# Patient Record
Sex: FEMALE | Race: WHITE | Employment: UNEMPLOYED | URBAN - METROPOLITAN AREA
[De-identification: names, ages, dates, MRNs, and addresses within clinical notes are randomized per-mention and may not be internally consistent; named-entity substitution may affect disease eponyms.]

---

## 2021-09-02 NOTE — PROGRESS NOTES
[address]  [address], [address]  [address] [place] [address]  Dept: [phone]  Dept Fax: [phone]  Loc: [phone]    Visit Date: 9/3/2021    Artur Stoll is a 64 y.o. female who presents today for: Established New Doctor and Menopause (discuss hot flashes )      Here to establish care as new patient. Assessment/Plan:     ASSESSMENT/PLAN:     Diagnosis Orders   1. Encounter to establish care with new doctor     2. Annual physical exam  CBC    Comprehensive Metabolic Panel    Lipid Panel    Vitamin D 25 Hydroxy   3. Encounter for screening for malignant neoplasm of breast, unspecified screening modality  [address] was seen today for established new doctor and menopause. Diagnoses and all orders for this visit:    Encounter to establish care with new doctor    Annual physical exam  -     CBC; Future  -     Comprehensive Metabolic Panel; Future  -     Lipid Panel; Future  -     Vitamin D 25 Hydroxy; Future    Encounter for screening for malignant neoplasm of breast, unspecified screening modality  -     [facility] DIGITAL SCREENING AUGMENTED BILATERAL; Future        HPI:     HPI  Patient's problem list, medications, past medical, surgical, family, and social histories were reviewed and updated in the chart as indicated today. There is no problem list on file for this patient. Current Outpatient Medications:     levothyroxine (SYNTHROID) 25 MCG tablet, TAKE 1 TABLET BY MOUTH ONCE DAILY, Disp: , Rfl:     Allergies   Allergen Reactions    Sulfa Antibiotics Other (See Comments)       Past Surgical History:   Procedure Laterality Date    APPENDECTOMY      BREAST SURGERY      Mammoplasty       Family History: History reviewed. No pertinent family history.     Social History:   Social History     Tobacco Use    Smoking status: Former Smoker     Packs/day: 0.50     Years: 10.00     Pack years: 5.00     Types: Cigarettes     Quit date: 2002     Years since quittin.6    Smokeless tobacco: Never Used   Substance Use Topics    Alcohol use: Not Currently        Subjective:      Review of Systems   Constitutional: Negative for unexpected weight change. HENT: Negative. Eyes: Negative for redness. Respiratory: Negative for shortness of breath. Cardiovascular: Negative for chest pain and leg swelling. Gastrointestinal: Negative for constipation, diarrhea, nausea and vomiting. Skin: Negative for pallor. Allergic/Immunologic: Negative for immunocompromised state. Psychiatric/Behavioral: Negative for confusion. Objective:     /82 (Site: Left Upper Arm, Position: Sitting, Cuff Size: Medium Adult)   Pulse 70   Ht 5' 7\" (1.702 m)   Wt 140 lb (63.5 kg)   SpO2 99%   BMI 21.93 kg/m²     Well developed well nourished patient in no acute distress. Alert and oriented to person, place, and time. HEENT:Normocephalic, atraumatic. No scleral icterus. Pupils normal  Heart: Regular Rate and Rhythm. No murmurs. Respirations: lungs clear to auscultation bilaterally. Abdomin: Bowel sounds present. Extremities: No peripheral edema. No erythema. Pap recommendations:  Age 33-67: cytology with HPV every 5 years.    .    Electronically signed by Rhoda Campbell DO on 9/3/2021 at 8:42 AM

## 2021-09-03 ENCOUNTER — OFFICE VISIT (OUTPATIENT)
Dept: FAMILY MEDICINE CLINIC | Age: 56
End: 2021-09-03
Payer: COMMERCIAL

## 2021-09-03 VITALS
HEIGHT: 67 IN | HEART RATE: 70 BPM | OXYGEN SATURATION: 99 % | SYSTOLIC BLOOD PRESSURE: 118 MMHG | DIASTOLIC BLOOD PRESSURE: 82 MMHG | BODY MASS INDEX: 21.97 KG/M2 | WEIGHT: 140 LBS

## 2021-09-03 DIAGNOSIS — Z00.00 ANNUAL PHYSICAL EXAM: ICD-10-CM

## 2021-09-03 DIAGNOSIS — Z76.89 ENCOUNTER TO ESTABLISH CARE WITH NEW DOCTOR: Primary | ICD-10-CM

## 2021-09-03 DIAGNOSIS — E03.9 ACQUIRED HYPOTHYROIDISM: ICD-10-CM

## 2021-09-03 DIAGNOSIS — Z12.39 ENCOUNTER FOR SCREENING FOR MALIGNANT NEOPLASM OF BREAST, UNSPECIFIED SCREENING MODALITY: ICD-10-CM

## 2021-09-03 LAB
A/G RATIO: 2 (ref 1.1–2.2)
ALBUMIN SERPL-MCNC: 4.8 G/DL (ref 3.4–5)
ALP BLD-CCNC: 76 U/L (ref 40–129)
ALT SERPL-CCNC: 11 U/L (ref 10–40)
ANION GAP SERPL CALCULATED.3IONS-SCNC: 12 MMOL/L (ref 3–16)
AST SERPL-CCNC: 21 U/L (ref 15–37)
BILIRUB SERPL-MCNC: 0.3 MG/DL (ref 0–1)
BUN BLDV-MCNC: 14 MG/DL (ref 7–20)
CALCIUM SERPL-MCNC: 9.8 MG/DL (ref 8.3–10.6)
CHLORIDE BLD-SCNC: 102 MMOL/L (ref 99–110)
CHOLESTEROL, TOTAL: 219 MG/DL (ref 0–199)
CO2: 26 MMOL/L (ref 21–32)
CREAT SERPL-MCNC: 0.8 MG/DL (ref 0.6–1.1)
GFR AFRICAN AMERICAN: >60
GFR NON-AFRICAN AMERICAN: >60
GLOBULIN: 2.4 G/DL
GLUCOSE BLD-MCNC: 94 MG/DL (ref 70–99)
HCT VFR BLD CALC: 37.5 % (ref 36–48)
HDLC SERPL-MCNC: 67 MG/DL (ref 40–60)
HEMOGLOBIN: 13 G/DL (ref 12–16)
LDL CHOLESTEROL CALCULATED: 136 MG/DL
MCH RBC QN AUTO: 31.4 PG (ref 26–34)
MCHC RBC AUTO-ENTMCNC: 34.6 G/DL (ref 31–36)
MCV RBC AUTO: 90.6 FL (ref 80–100)
PDW BLD-RTO: 13.3 % (ref 12.4–15.4)
PLATELET # BLD: 244 K/UL (ref 135–450)
PMV BLD AUTO: 7.7 FL (ref 5–10.5)
POTASSIUM SERPL-SCNC: 4.8 MMOL/L (ref 3.5–5.1)
RBC # BLD: 4.14 M/UL (ref 4–5.2)
SODIUM BLD-SCNC: 140 MMOL/L (ref 136–145)
T4 FREE: 1 NG/DL (ref 0.9–1.8)
TOTAL PROTEIN: 7.2 G/DL (ref 6.4–8.2)
TRIGL SERPL-MCNC: 79 MG/DL (ref 0–150)
TSH REFLEX FT4: 8.4 UIU/ML (ref 0.27–4.2)
VITAMIN D 25-HYDROXY: 34.1 NG/ML
VLDLC SERPL CALC-MCNC: 16 MG/DL
WBC # BLD: 4.3 K/UL (ref 4–11)

## 2021-09-03 PROCEDURE — 36415 COLL VENOUS BLD VENIPUNCTURE: CPT | Performed by: FAMILY MEDICINE

## 2021-09-03 PROCEDURE — 99386 PREV VISIT NEW AGE 40-64: CPT | Performed by: FAMILY MEDICINE

## 2021-09-03 RX ORDER — LEVOTHYROXINE SODIUM 0.03 MG/1
TABLET ORAL
COMMUNITY
Start: 2021-08-08 | End: 2022-08-05 | Stop reason: SDUPTHER

## 2021-09-03 SDOH — ECONOMIC STABILITY: FOOD INSECURITY: WITHIN THE PAST 12 MONTHS, YOU WORRIED THAT YOUR FOOD WOULD RUN OUT BEFORE YOU GOT MONEY TO BUY MORE.: NEVER TRUE

## 2021-09-03 SDOH — ECONOMIC STABILITY: FOOD INSECURITY: WITHIN THE PAST 12 MONTHS, THE FOOD YOU BOUGHT JUST DIDN'T LAST AND YOU DIDN'T HAVE MONEY TO GET MORE.: NEVER TRUE

## 2021-09-03 ASSESSMENT — PATIENT HEALTH QUESTIONNAIRE - PHQ9
SUM OF ALL RESPONSES TO PHQ9 QUESTIONS 1 & 2: 0
2. FEELING DOWN, DEPRESSED OR HOPELESS: 0
1. LITTLE INTEREST OR PLEASURE IN DOING THINGS: 0
SUM OF ALL RESPONSES TO PHQ QUESTIONS 1-9: 0

## 2021-09-03 ASSESSMENT — SOCIAL DETERMINANTS OF HEALTH (SDOH): HOW HARD IS IT FOR YOU TO PAY FOR THE VERY BASICS LIKE FOOD, HOUSING, MEDICAL CARE, AND HEATING?: NOT HARD AT ALL

## 2021-09-03 NOTE — PATIENT INSTRUCTIONS
Your fasting blood sugar should be below 100. If it is between 100-125 that is considered high and known as prediabetes, or  impaired glucose tolerance. If your blood sugar is too high, go to diabetes. org for a diabetes prevention diet. The A1c shows your average blood sugar over the previous 3 months. It is sometimes ordered if your blood sugar is elevated  You do not have to be fasting to get it drawn. If it is 5.6 or below it means your blood sugar is in the normal range  If between 5.7 and 6.5 it is impaired glucose tolerance. If it is above 6.5 it is consistent with the diagnosis of  diabetes. Decrease high carb foods if your blood sugar is high. High carbohydrate foods are:  Breads, muffins, rolls, and bagels  Pasta, rice, corn, and grains  Potatoes, sweet potatoes  Legumes: peas beans lentils. Milk ( almond milk ok)  Most fruit except berries  Cake cookies pies ice cream candy etc.  Juices, soda, sweetened ice tea  Beer. Cholesterol, the ideal numbers explained: Total cholesterol should be below 200  The triglycerides should be below 150  The HDL, the good cholesterol should be above 40  The LDL, the bad cholesterol should be below 100. Although it can be as high as 130 if no risk factors for heart disease or no diabetes. Improve your cholesterol profile:     Cardiovascular exercise helps. Start with 15 minutes three times a week and the work up to at least 30 minutes 5 days a week. Exercise at a level that you can carry on a conversation during. Loose extra pounds. Pay attention to your serving sides and avoid eating second helpings at meals. Significantly decrease the amount of processed food, junk food, and fast food that you eat. Decrease animal sources of saturated fats, and completely avoid and eliminate  hydrogenated oils and trans fats. Check the Food Label on the food you eat and avoid foods that have hydrogenated vegetable oils in them.  That includes bakery products. Drink skim milk which contains no fat. Increase the amount of fresh food that you cook yourself. Eat at least five servings of fruits and vegetables a day. Increase the portion of your plate that contains the fruit and vegetables to at least 50%,-70%,  and decrease the amount of starches like potatoes, rice, pasta to no more than 25% of your plate. Increase your fiber intake. Fiber is found in fruits and vegetables as well as whole grains, oatmeal,  and beans. A diet with at least 5 servings of fruits and vegetables should give you about 10-20grams of fiber daily. Switch to monounsaturated fats like olive oil. Fat from olives, avocados, coconut, or other nuts is okay. Add baked or grilled fish to you diet at least 1-2 times a week. Learn more about cholesterol on the Internet. Check out these resources:    American Heart Association   Heart. [address]:   [employer]    Triglycerides can be lowered without medication by exercising, and loosing weight and eating a diet lower in carbohydrates especially from flour sources,  and avoiding simple sugars. The good cholesterol is HDL. In order to increase the good cholesterol, increasing cardiovascular exercise helps. Avoid hydrogenated oils (trans fats) in processed, bakery,  and junk food. Use monounsaturated fats such as olive oil can help raise the good cholesterol. For your weight, exercise 30 minutes 5 times weekly and improve the types of food you eat:    Protein   Best options: The American Diabetes Association (ADA) recommends lean proteins low in saturated fat, like fish or turkey. Aim for two servings of seafood each week; some fish, like salmon, have the added benefit of containing heart healthy omega-3 fats. For a vegetarian protein source, experiment with the wide variety of beans. Nuts, which are protein and healthy fats powerhouses, are also a choice.    Worst options: Processed deli meats and hot dogs have high amounts of fat along with lots of sodium, which can increase the risk of high blood pressure. Heart attack and stroke are two common complications of diabetes, so keeping blood pressure in check is important. Grains   Best options: When choosing grains, make sure theyre whole. Decrease the amount of foods which contain flour. Whole grains such as wild rice, quinoa, and whole grain breads and cereals contain fiber, which is beneficial for digestive health, but often the grains flour products raise your blood sugar and when your blood sugar returns to normal, it can trigger the desire to eat again.   Worst options: Refined white flour doesnt contain the same health benefits as whole grains. Processed foods made with white flour include breakfast cereals, white bread, and pastries, cookies, muffins, pretzels, crackers, so avoid these options. Also try to steer clear of white rice and pasta. Dairy   Best options: With only 6 to 8 grams of carbohydrates in a serving, plain nonfat [place] yogurt is a healthy and versatile dairy option. You can add berries and enjoy it for dessert or breakfast; you can use it in recipes as a replacement for sour cream, which is high in saturated fat. Skim milk.  Worst options: Avoid all full-fat dairy products and especially packaged chocolate milk, as it also has added sugar. Avoid yogurts with added sugar. Vegetables   Best options: Non-starchy vegetables such as leafy greens, broccoli, cauliflower, asparagus, and carrots are low in carbohydrates and high in fiber and other nutrients, Raysa Regina says. You can eat non-starchy vegetables in abundance -- half of your plate should be filled with these veggies. If youre craving mashed potatoes, give mashed cauliflower a try.  Worst options: Stick to small portions of starchy vegetables such as corn, potatoes, and peas. These items are nutritious, but should be eaten in moderation.  The ADA groups them with grains because of their high carb content. Fruit   Best options: Fresh fruit can conquer your craving for sweets while providing antioxidants and fiber. Berries are a great option because recommended portion sizes are typically generous, which may leave you feeling more satisfied   Worst options: Avoid added sugar by eliminating fruits canned in syrup, and be aware that dried fruits have a very high sugar concentration. Also, fruit juices should be consumed rarely as theyre high in sugar and dont contain the same nutrients as whole fruit. Fats   Best options: Some types of fat actually help protect your heart. Choose the monounsaturated fats found in avocados, almonds, olives, and pecans or the polyunsaturated fats found in walnuts and sunflower oil, which can help to lower bad cholesterol.  Worst options: Saturated fats increase bad cholesterol, so limit butter, cheese, gravy, and fried foods. Keep calories from animal sources of saturated fat to less than 10 percent of your total daily intake. Trans fats are even worse than saturated fats, so avoid them completely. Look for the term hydrogenated on labels of processed foods such as packaged snacks, baked goods, and crackers.  For more help losing weight:         Keep track of what you eat everyday :      Lose it! ben for the smart phone, or loseit. com or Notifixious.Fetch MD are free online tools you can use to track your daily intake.     The online tools will help you establish your goals for weight loss and how much you should eat a day to accomplish the goal.       A BMI (body mass index) of 25 to 29 is considered overweight, a BMI of 30 and above is considered obese. BMI is found with your vitals on your After Visit Summary. In addition to the above diet info,    Iincrease the amount of fruit and vegetables you eat each day. Ideally you are eating at least 5 servings of fruit and or vegetables a day.        Drink at least 50 ounces of water a day.      Avoid or decrease processed food, fast food, and junk food.     Begin to exercise 15 minutes three times a week doing cardiovascular exercise.     Don't quit. It can take 12 weeks to break old habits before you feel like you are in a new routine. Then you have to live your new lifestyle. Irina Ramos Once you are adjusted to your new habits you will not have to keep recording your daily intake, you should have a good idea of what is a normal amount for you to eat each day.     If you are obese, have elevated blood sugar, heart disease or a medical condition that is worsened by excess weight, I can refer you to a [employer] for education, usually covered by insurance.      Over the counter supplements may help. Estroven or icool gave good satisfaction reports from women with hot flashes. Proper diet helps: At least 5 servings of fruits and vegetables, and low to no amounts of simple sugars. Adding soy based foods to your diet helps, such as tofu, and soy milk. You can  ground flax seeds to your salads, or milk can help. Adding melatonin supplement may help you sleep better. Vitamin D3 at 1000units a day will be beneficial in this perimenopausal time as well especially if you are low in Vitamin D. Lowering room temperatures. Consuming cool drinks. Avoiding alcohol and caffeine. For overweight and obese women, weight loss can also help. Consider prescription medication of Effexor or Paxil which can be helpful in hot flashes.

## 2021-09-07 DIAGNOSIS — E03.9 ACQUIRED HYPOTHYROIDISM: Primary | ICD-10-CM

## 2022-01-10 ENCOUNTER — TELEPHONE (OUTPATIENT)
Dept: FAMILY MEDICINE CLINIC | Age: 57
End: 2022-01-10

## 2022-01-10 NOTE — TELEPHONE ENCOUNTER
Patient has had bad cough for three days producing thick yellow mucus. . Patient has been taking Nyquil and Dayquil that has helped some. Patient had has chills and sweats and whole body pains. Galileo Briscoe is asking for medication. She says her cough is bad and painful. No future appointments. Past appointment was 9/3/21. Patient's pharmacy is [facility].

## 2022-03-02 NOTE — PROGRESS NOTES
03/04/22      [facility]  Chief Complaint   Patient presents with    Discuss Labs     wanting lung scan / respiratory issues last couple months           Diagnosis Orders   1.  bronchitis, repeated  CT Lung Screen (Initial or Annual)   2. Former smoker     3. Dermatitis  triamcinolone (KENALOG) 0.025 % cream   4. Gastroesophageal reflux disease, unspecified whether esophagitis present       Adenike was seen today for discuss labs. Diagnoses and all orders for this visit:    History of repeated bronchitis since last summer  She is interested in getting a CT lung screening at [address] [place] [address] (Initial or Annual); Future    Dermatitis, intermittent. -     triamcinolone (KENALOG) 0.025 % cream; Apply topically 2 times daily as needed for rash,  up to 2 weeks, . Gastroesophageal reflux disease, more likely than gastritis due to physical exam today, exacerbated by drinking coffee. Plan is to decrease the offending coffee, take Pepcid as needed, if it is persistent daily I would recommend a EDG        HPI  Was sick with bronchitis in the fall, then in January flared up again. She had upper respiratory infection in January. She is interested in the lung scan. She quit smoking in 2002 after smoking half pack a day for 10 years. Has a 5-pack-year history. Her last labs were done in September when she was a new patient. Those labs are reviewed. Her TSH was high with a normal T4 and it was recommended to get that rechecked in 6 weeks. That was in September. She is on thyroid medication. Her vitamin D was normal, her blood count was normal, her cholesterol was high with a good H DL. The LDL was 136.   These electrolytes liver and kidney function were all normal.  Protein and calcium also normal.  Past Medical History:   Diagnosis Date    Hypothyroidism      Social History     Socioeconomic History    Marital status:      Spouse name: Not on file    Number of children: Not on file    Years of education: Not on file    Highest education level: Not on file   Occupational History    Not on file   Tobacco Use    Smoking status: Former Smoker     Packs/day: 0.50     Years: 10.00     Pack years: 5.00     Types: Cigarettes     Quit date: 2002     Years since quittin.1    Smokeless tobacco: Never Used   Vaping Use    Vaping Use: Never used   Substance and Sexual Activity    Alcohol use: Not Currently    Drug use: Never    Sexual activity: Not Currently   Other Topics Concern    Not on file   Social History Narrative    Not on file     Social Determinants of Health     Financial Resource Strain: Low Risk     Difficulty of Paying Living Expenses: Not hard at all   Food Insecurity: No Food Insecurity    Worried About [address] in the Last Year: Never true    [address]  [employer] in the Last Year: Never true   Transportation Needs:     Lack of Transportation (Medical): Not on file    Lack of Transportation (Non-Medical):  Not on file   Physical Activity:     Days of Exercise per Week: Not on file    Minutes of Exercise per Session: Not on file   Stress:     Feeling of Stress : Not on file   Social Connections:     Frequency of Communication with Friends and Family: Not on file    Frequency of Social Gatherings with Friends and Family: Not on file    Attends Gnosticism Services: Not on file    Active Member of [address] [place] [address] or Organizations: Not on file    Attends Club or Organization Meetings: Not on file    Marital Status: Not on file   Intimate Partner Violence:     Fear of Current or Ex-Partner: Not on file    Emotionally Abused: Not on file    Physically Abused: Not on file    Sexually Abused: Not on file   Housing Stability:     Unable to Pay for Housing in the Last Year: Not on file    Number of [place] in the Last Year: Not on file    Unstable Housing in the Last Year: Not on file           Current Outpatient Medications   Medication Sig Dispense Refill    triamcinolone (KENALOG) 0.025 % cream Apply topically 2 times daily as needed for rash,  up to 2 weeks, . 60 g 0    levothyroxine (SYNTHROID) 25 MCG tablet TAKE 1 TABLET BY MOUTH ONCE DAILY       No current facility-administered medications for this visit. Vitals:    03/04/22 0838   BP: 128/84   Pulse: 70   SpO2: 95%     BP Readings from Last 5 Encounters:   03/04/22 128/84   09/03/21 118/82       ROS:  No fever or chills  No unexpected wt loss  No headaches  No chest pain  No shortness of breath  + nausea, no vomiting or diarrhea      Physical Exam  Well developed well nourished patient   in no acute distress. Alert and oriented to person, place, and time. HEENT:Normocephalic, atraumatic. No scleral icterus. Pupils normal  Heart: Regular Rate and Rhythm. Respirations: lungs clear to auscultation bilaterally. Abdomin: Bowel sounds present. No hypogastric tenderness no masses  Extremities: No peripheral edema. No erythema. Skin: Positive hypopigmented macules, erythematous papules on the upper back left side      Time spent today included for this patient visit includes time spent preparing to see the patient  Including review of tests, labs and imaging,   revewing previous history and recent encounters,   obtaining and/or reviewing separately obtained history in care everywhere,  performing a medically appropriate examination and/or evaluation;   counseling and educating the patient and /family/caregiver when present,  ordering medications, tests, or procedures;   referring to other health care specialists;   documenting clinical information in the electronic health record;   independently interpreting results (not separately reported)   and communicating results to the patient. An electronic signature was used to authenticate this note. This was dictated. Errors mightbe possible due to dictation.   --Lizeth Montano,

## 2022-03-04 ENCOUNTER — OFFICE VISIT (OUTPATIENT)
Dept: FAMILY MEDICINE CLINIC | Age: 57
End: 2022-03-04
Payer: COMMERCIAL

## 2022-03-04 VITALS
HEART RATE: 70 BPM | HEIGHT: 67 IN | OXYGEN SATURATION: 95 % | BODY MASS INDEX: 21.93 KG/M2 | SYSTOLIC BLOOD PRESSURE: 128 MMHG | DIASTOLIC BLOOD PRESSURE: 84 MMHG

## 2022-03-04 DIAGNOSIS — Z87.09 HISTORY OF BRONCHITIS: Primary | ICD-10-CM

## 2022-03-04 DIAGNOSIS — Z87.891 FORMER SMOKER: ICD-10-CM

## 2022-03-04 DIAGNOSIS — K21.9 GASTROESOPHAGEAL REFLUX DISEASE, UNSPECIFIED WHETHER ESOPHAGITIS PRESENT: ICD-10-CM

## 2022-03-04 DIAGNOSIS — L30.9 DERMATITIS: ICD-10-CM

## 2022-03-04 PROCEDURE — 3017F COLORECTAL CA SCREEN DOC REV: CPT | Performed by: FAMILY MEDICINE

## 2022-03-04 PROCEDURE — G8427 DOCREV CUR MEDS BY ELIG CLIN: HCPCS | Performed by: FAMILY MEDICINE

## 2022-03-04 PROCEDURE — G8484 FLU IMMUNIZE NO ADMIN: HCPCS | Performed by: FAMILY MEDICINE

## 2022-03-04 PROCEDURE — 99214 OFFICE O/P EST MOD 30 MIN: CPT | Performed by: FAMILY MEDICINE

## 2022-03-04 PROCEDURE — 1036F TOBACCO NON-USER: CPT | Performed by: FAMILY MEDICINE

## 2022-03-04 PROCEDURE — G8420 CALC BMI NORM PARAMETERS: HCPCS | Performed by: FAMILY MEDICINE

## 2022-03-04 RX ORDER — TRIAMCINOLONE ACETONIDE 0.25 MG/G
CREAM TOPICAL
Qty: 60 G | Refills: 0 | Status: SHIPPED | OUTPATIENT
Start: 2022-03-04

## 2022-05-27 ENCOUNTER — OFFICE VISIT (OUTPATIENT)
Dept: FAMILY MEDICINE CLINIC | Age: 57
End: 2022-05-27
Payer: COMMERCIAL

## 2022-05-27 VITALS
OXYGEN SATURATION: 98 % | HEART RATE: 65 BPM | SYSTOLIC BLOOD PRESSURE: 130 MMHG | DIASTOLIC BLOOD PRESSURE: 84 MMHG | TEMPERATURE: 97.1 F | RESPIRATION RATE: 16 BRPM

## 2022-05-27 DIAGNOSIS — R20.2 RIGHT LEG PARESTHESIAS: Primary | ICD-10-CM

## 2022-05-27 DIAGNOSIS — G57.01 PIRIFORMIS SYNDROME OF RIGHT SIDE: ICD-10-CM

## 2022-05-27 PROCEDURE — G8427 DOCREV CUR MEDS BY ELIG CLIN: HCPCS | Performed by: NURSE PRACTITIONER

## 2022-05-27 PROCEDURE — 3017F COLORECTAL CA SCREEN DOC REV: CPT | Performed by: NURSE PRACTITIONER

## 2022-05-27 PROCEDURE — 99214 OFFICE O/P EST MOD 30 MIN: CPT | Performed by: NURSE PRACTITIONER

## 2022-05-27 PROCEDURE — 1036F TOBACCO NON-USER: CPT | Performed by: NURSE PRACTITIONER

## 2022-05-27 PROCEDURE — G8420 CALC BMI NORM PARAMETERS: HCPCS | Performed by: NURSE PRACTITIONER

## 2022-05-27 RX ORDER — OMEGA-3/DHA/EPA/FISH OIL 300-1000MG
2 CAPSULE ORAL DAILY
COMMUNITY

## 2022-05-27 RX ORDER — ERGOCALCIFEROL 1.25 MG/1
CAPSULE ORAL
COMMUNITY

## 2022-05-27 ASSESSMENT — PATIENT HEALTH QUESTIONNAIRE - PHQ9
DEPRESSION UNABLE TO ASSESS: FUNCTIONAL CAPACITY MOTIVATION LIMITS ACCURACY
SUM OF ALL RESPONSES TO PHQ QUESTIONS 1-9: 0
1. LITTLE INTEREST OR PLEASURE IN DOING THINGS: 0
SUM OF ALL RESPONSES TO PHQ9 QUESTIONS 1 & 2: 0
SUM OF ALL RESPONSES TO PHQ QUESTIONS 1-9: 0
2. FEELING DOWN, DEPRESSED OR HOPELESS: 0
SUM OF ALL RESPONSES TO PHQ QUESTIONS 1-9: 0
SUM OF ALL RESPONSES TO PHQ QUESTIONS 1-9: 0

## 2022-05-27 ASSESSMENT — ANXIETY QUESTIONNAIRES
6. BECOMING EASILY ANNOYED OR IRRITABLE: 0
IF YOU CHECKED OFF ANY PROBLEMS ON THIS QUESTIONNAIRE, HOW DIFFICULT HAVE THESE PROBLEMS MADE IT FOR YOU TO DO YOUR WORK, TAKE CARE OF THINGS AT HOME, OR GET ALONG WITH OTHER PEOPLE: NOT DIFFICULT AT ALL
1. FEELING NERVOUS, ANXIOUS, OR ON EDGE: 0
3. WORRYING TOO MUCH ABOUT DIFFERENT THINGS: 0
4. TROUBLE RELAXING: 0
GAD7 TOTAL SCORE: 0
7. FEELING AFRAID AS IF SOMETHING AWFUL MIGHT HAPPEN: 0
5. BEING SO RESTLESS THAT IT IS HARD TO SIT STILL: 0
2. NOT BEING ABLE TO STOP OR CONTROL WORRYING: 0

## 2022-05-27 ASSESSMENT — ENCOUNTER SYMPTOMS
SHORTNESS OF BREATH: 0
DIARRHEA: 0
VOMITING: 0
NAUSEA: 0
BACK PAIN: 1
COUGH: 0

## 2022-05-27 NOTE — PROGRESS NOTES
2022     Chief Complaint   Patient presents with    Other     leg numbness for a year and tingling in right foot      Kaylin Mattson (:  1965) is a 62 y.o. female, here for evaluation of the following medical concerns:    HPI    Here with concerns of numbness to the right foot heel area for the past year. Over the past week has noticed some tingling in the foot and leg area. Has some occasional low back ache but no increase in this pain recently. Has tight IT bands. She initially thought her symptoms were related to sitting on a barstool for long period of time. She denies any leg weakness, falls, bowel or bladder incontinence or saddle anesthesia. She has done a few stretches and takes some type of natural anti-inflammatory supplement which have not provided much relief. Feels well otherwise without any other concerns today. Review of Systems   Constitutional: Negative for chills, fatigue and fever. Respiratory: Negative for cough and shortness of breath. Cardiovascular: Negative for chest pain and leg swelling. Gastrointestinal: Negative for diarrhea, nausea and vomiting. Musculoskeletal: Positive for back pain (slight ache at times). Neurological: Negative for dizziness and headaches. All other systems reviewed and are negative. Prior to Visit Medications    Medication Sig Taking? Authorizing Provider   Multiple Vitamin (MULTIVITAMIN ADULT PO) Take 1 tablet by mouth daily Yes Historical Provider, MD   fish oil-omega-3 fatty acids 1000 MG capsule Take 2 g by mouth daily Yes Historical Provider, MD   vitamin D (ERGOCALCIFEROL) 1.25 MG (16111 UT) CAPS capsule Take by mouth Yes Historical Provider, MD   CALCIUM ASCORBATE PO Take by mouth Yes Historical Provider, MD   triamcinolone (KENALOG) 0.025 % cream Apply topically 2 times daily as needed for rash,  up to 2 weeks, .  Yes Felecia Tarango DO   levothyroxine (SYNTHROID) 25 MCG tablet TAKE 1 TABLET BY MOUTH ONCE DAILY Yes Historical Provider, MD        Social History     Tobacco Use    Smoking status: Former Smoker     Packs/day: 0.50     Years: 10.00     Pack years: 5.00     Types: Cigarettes     Quit date: 2002     Years since quittin.4    Smokeless tobacco: Never Used   Substance Use Topics    Alcohol use: Not Currently        Vitals:    22 0656   BP: 130/84   Site: Left Upper Arm   Position: Sitting   Pulse: 65   Resp: 16   Temp: 97.1 °F (36.2 °C)   TempSrc: Temporal   SpO2: 98%   Weight: Comment: Refused to get on scale     Estimated body mass index is 21.93 kg/m² as calculated from the following:    Height as of 3/4/22: 5' 7\" (1.702 m). Weight as of 9/3/21: 140 lb (63.5 kg). Physical Exam  Vitals and nursing note reviewed. Constitutional:       General: She is not in acute distress. Appearance: Normal appearance. She is well-developed and normal weight. She is not ill-appearing, toxic-appearing or diaphoretic. HENT:      Head: Normocephalic and atraumatic. Eyes:      Extraocular Movements: Extraocular movements intact. Conjunctiva/sclera: Conjunctivae normal.      Pupils: Pupils are equal, round, and reactive to light. Cardiovascular:      Rate and Rhythm: Normal rate. Heart sounds: S1 normal and S2 normal.   Pulmonary:      Effort: Pulmonary effort is normal. No respiratory distress. Musculoskeletal:      Lumbar back: No swelling, deformity, lacerations, spasms or tenderness. Right upper leg: No swelling or tenderness. Right lower leg: No swelling or tenderness. Left lower leg: No swelling or tenderness. Neurological:      General: No focal deficit present. Mental Status: She is alert and oriented to person, place, and time. Mental status is at baseline. Cranial Nerves: No cranial nerve deficit. Deep Tendon Reflexes:      Reflex Scores:       Patellar reflexes are 2+ on the right side and 2+ on the left side.      Comments: Monofilament testing bilateral feet: Normal   Psychiatric:         Speech: Speech normal.       ASSESSMENT/PLAN:  1. Right leg paresthesias  - EMG; Future    2. Piriformis syndrome of right side    We discussed possible cause of her symptoms including a radiculopathy, peripheral neuropathy, piriformis syndrome, MS. During exam today her monofilament testing was normal bilateral feet and lower extremities. Patellar reflexes were normal. She did have increased tingling with SLR of right leg. She has occasional low back ache and tightness in the buttocks and IT band. We will try anti-inflammatory naproxen or ibuprofen for 5 to 7 days and provided her with some piriformis stretches. If her symptoms fail to improve we can go ahead and get the EMG completed. She will call sooner with any new or worsening of symptoms. An electronic signature was used to authenticate this note.     --TAM Cuevas - CNP on 5/27/2022 at 7:21 AM

## 2022-05-27 NOTE — PATIENT INSTRUCTIONS
· Try anti-inflammatory consistently for the next 5 to 7 days. You could take Aleve twice daily as directed on the bottle or Advil 600 mg 3 times daily. Make sure you take the anti-inflammatory with food. · Try the stretches that I provided below  · If the numbness and tingling does not improve with these measures then I do recommend going ahead and scheduling the EMG. · Call next week with update on symptoms      Patient Education        Piriformis Syndrome: Care Instructions  Your Care Instructions     The piriformis muscle is deep under your rear end (buttock). One end of the muscle connects deep inside the pelvic area, and the other end attaches to the top of the thighbone. This muscle can press on the sciatic nerve that runs from your spine down your leg. When this happens, you may have pain, numbness, and tingling in the buttock and down the back of your leg. This is called piriformis syndrome. The pain may get worse when you sit for a long time or climb stairs. Also, you may be more likely to develop piriformis syndrome ifyou run or walk often. Your doctor will check for other causes of your pain before treating this syndrome. Treatment may include stretching exercises, massage, and medicine for the pain and swelling. If these do not help, you may get a shot of steroid medicine. Until the pain is gone, you may need to rest the muscle and limit activities like running. Exercises and a change in how you move and sit may beenough to stop the pressure on the nerve. Follow-up care is a key part of your treatment and safety. Be sure to make and go to all appointments, and call your doctor if you are having problems. It's also a good idea to know your test results and keep alist of the medicines you take. How can you care for yourself at home?  If your doctor thinks that strenuous exercise is causing your problem, stop or cut back on activities such as running.  You may find swimming to be a good exercise for a while.  Stretch the piriformis muscle. ? Lie on your back. ? Bend one leg at the knee and keep the other leg flat on the ground. ? Raise your bent knee up and then move it across your body. Hold the outside of the knee with the opposite hand. ? Gently pull the knee with your hand toward the opposite shoulder. ? Hold the stretch for at least 15 to 30 seconds. Switch legs. ? Do the stretch several times each day.  Massage the muscle to relieve pressure. ? Sit on the floor. Lean to one side so that the hip on your sore side is off the ground. Put a tennis ball under your buttock on that side. ? As you put weight onto the tennis ball, you may find spots that are especially sore. Move gently so that the tennis ball gently massages each of the sore spots.  Use ice or heat to help reduce pain. Put ice or a cold pack or a heating pad set on low or a warm cloth on the sore area for 10 to 20 minutes at a time. Put a thin cloth between the ice pack or heating pad and your skin.  Ask your doctor if you can take an over-the-counter pain medicine, such as acetaminophen (Tylenol), ibuprofen (Advil, Motrin), or naproxen (Aleve). Be safe with medicines. Read and follow all instructions on the label.  Have your doctor or a physical therapist watch how you move. You may need physical therapy or special inserts in your shoes (orthotics) to help you move in a way that does not put pressure on your nerves. When should you call for help? Watch closely for changes in your health, and be sure to contact your doctor if:     You do not feel better after several weeks of home care.      Your pain gets worse.      Your leg becomes weak or numb. Where can you learn more? Go to https://[employer]winderewkashif.[email or web address]. org and sign in to your [employer] account. Enter S101 in the myWebRoom box to learn more about \"Piriformis Syndrome: Care Instructions. \"     If you do not have an account, please click on the \"Sign Up Now\" link. Current as of: July 1, 2021               Content Version: 13.2  © 2006-2022 Healthwise, [employer]. Care instructions adapted under license by [facility] [place] ([facility]). If you have questions about a medical condition or this instruction, always ask your healthcare professional. [address] any warranty or liability for your use of this information. Patient Education        Piriformis Syndrome: Exercises  Introduction  Here are some examples of exercises for you to try. The exercises may be suggested for a condition or for rehabilitation. Start each exercise slowly. Ease off the exercises if you start to have pain. You will be told when to start these exercises and which ones will work bestfor you. How to do the exercises  Hip rotator stretch    1. Lie on your back with both knees bent and your feet flat on the floor. 2. Put the ankle of your affected leg on your opposite thigh near your knee. 3. Use your hand to gently push your knee (on your affected leg) away from your body until you feel a gentle stretch around your hip. 4. Hold the stretch for 15 to 30 seconds. 5. Repeat 2 to 4 times. 6. Switch legs and repeat steps 1 through 5. Piriformis stretch    1. Lie on your back with your legs straight. 2. Lift your affected leg and bend your knee. With your opposite hand, reach across your body, and then gently pull your knee toward your opposite shoulder. 3. Hold the stretch for 15 to 30 seconds. 4. Repeat with your other leg. 5. Repeat 2 to 4 times on each side. Lower abdominal strengthening    1. Lie on your back with your knees bent and your feet flat on the floor. 2. Tighten your belly muscles by pulling your belly button in toward your spine. 3. Lift one foot off the floor and bring your knee toward your chest, so that your knee is straight above your hip and your leg is bent like the letter \"L. \"  4. Lift the other knee up to the same position.   5. Lower one leg at a time to the starting position. 6. Keep alternating legs until you have lifted each leg 8 to 12 times. 7. Be sure to keep your belly muscles tight and your back still as you are moving your legs. Be sure to breathe normally. Follow-up care is a key part of your treatment and safety. Be sure to make and go to all appointments, and call your doctor if you are having problems. It's also a good idea to know your test results and keep alist of the medicines you take. Current as of: July 1, 2021               Content Version: 13.2  © 2006-2022 Healthwise, Inc[employer]. Care instructions adapted under license by [facility] ([facility]). If you have questions about a medical condition or this instruction, always ask your healthcare professional. [address] any warranty or liability for your use of this information.

## 2022-06-07 ENCOUNTER — TELEPHONE (OUTPATIENT)
Dept: OTHER | Facility: CLINIC | Age: 57
End: 2022-06-07

## 2022-06-07 NOTE — TELEPHONE ENCOUNTER
Carlos Giordano was contacted today as part of [facility] to schedule a mammogram.         I left a message reminding the patient that they have an open order from [facility] [place] and need to [employer] or myself to schedule a mammogram.    Justin Jaquez 25

## 2022-06-29 ENCOUNTER — TELEMEDICINE (OUTPATIENT)
Dept: FAMILY MEDICINE CLINIC | Age: 57
End: 2022-06-29
Payer: COMMERCIAL

## 2022-06-29 ENCOUNTER — NURSE TRIAGE (OUTPATIENT)
Dept: OTHER | Facility: CLINIC | Age: 57
End: 2022-06-29

## 2022-06-29 DIAGNOSIS — M89.8X1 PAIN OF RIGHT CLAVICLE: ICD-10-CM

## 2022-06-29 DIAGNOSIS — R22.2 SOFT TISSUE SWELLING OF CHEST WALL: Primary | ICD-10-CM

## 2022-06-29 PROCEDURE — 99214 OFFICE O/P EST MOD 30 MIN: CPT | Performed by: FAMILY MEDICINE

## 2022-06-29 PROCEDURE — G8427 DOCREV CUR MEDS BY ELIG CLIN: HCPCS | Performed by: FAMILY MEDICINE

## 2022-06-29 PROCEDURE — 3017F COLORECTAL CA SCREEN DOC REV: CPT | Performed by: FAMILY MEDICINE

## 2022-06-29 RX ORDER — ZINC GLUCONATE 50 MG
50 TABLET ORAL DAILY
COMMUNITY

## 2022-06-29 ASSESSMENT — ANXIETY QUESTIONNAIRES
GAD7 TOTAL SCORE: 0
6. BECOMING EASILY ANNOYED OR IRRITABLE: 0
7. FEELING AFRAID AS IF SOMETHING AWFUL MIGHT HAPPEN: 0
5. BEING SO RESTLESS THAT IT IS HARD TO SIT STILL: 0
IF YOU CHECKED OFF ANY PROBLEMS ON THIS QUESTIONNAIRE, HOW DIFFICULT HAVE THESE PROBLEMS MADE IT FOR YOU TO DO YOUR WORK, TAKE CARE OF THINGS AT HOME, OR GET ALONG WITH OTHER PEOPLE: NOT DIFFICULT AT ALL
3. WORRYING TOO MUCH ABOUT DIFFERENT THINGS: 0
1. FEELING NERVOUS, ANXIOUS, OR ON EDGE: 0
2. NOT BEING ABLE TO STOP OR CONTROL WORRYING: 0
4. TROUBLE RELAXING: 0

## 2022-06-29 ASSESSMENT — PATIENT HEALTH QUESTIONNAIRE - PHQ9
SUM OF ALL RESPONSES TO PHQ QUESTIONS 1-9: 0
SUM OF ALL RESPONSES TO PHQ QUESTIONS 1-9: 0
2. FEELING DOWN, DEPRESSED OR HOPELESS: 0
SUM OF ALL RESPONSES TO PHQ9 QUESTIONS 1 & 2: 0
SUM OF ALL RESPONSES TO PHQ QUESTIONS 1-9: 0
1. LITTLE INTEREST OR PLEASURE IN DOING THINGS: 0
SUM OF ALL RESPONSES TO PHQ QUESTIONS 1-9: 0

## 2022-06-29 NOTE — TELEPHONE ENCOUNTER
Received call from Bradley Jean at [facility] with Red Flag Complaint. Subjective: Caller states \"has swelling on right side of collar bone and been there for a week. Thought pulled a muscle and is painful and swollen. \"     Current Symptoms: Collar bone and neck pain and swelling    Onset: 1 week ago; worsening    Associated Symptoms: NA    Pain Severity: 4/10; dull, aching; constant    Temperature:  Denies Fever    What has been tried: massage it    LMP: NA Pregnant: NA    Recommended disposition: Go to Office Now    Care advice provided, patient verbalizes understanding; denies any other questions or concerns; instructed to call back for any new or worsening symptoms. Patient/Caller agrees with recommended disposition; writer provided warm transfer to [place] at [facility] for appointment scheduling     Attention Provider: Thank you for allowing me to participate in the care of your patient. The patient was connected to triage in response to information provided to the ECC/PSC. Please do not respond through this encounter as the response is not directed to a shared pool.           Reason for Disposition   SEVERE pain (e.g., excruciating, unable to do any normal activities)    Protocols used: NECK PAIN OR STIFFNESS-ADULT-[place]

## 2022-06-29 NOTE — PROGRESS NOTES
TELEHEALTH EVALUATION -- Audio/Visual (During [facility]-15 public health emergency)    HPI:  Sohan Paz (:  1965) is a 62 y.o. female,  here for evaluation of the following chief complaint(s):  Neck Pain (collar bone pain swelling and tender)  neck/chest swelling    ASSESSMENT/PLAN:   Diagnosis Orders   1. Soft tissue swelling of chest wall  XR CHEST (2 VW)   2. Pain of right clavicle       Adenike was seen today for neck pain. Diagnoses and all orders for this visit:    Soft tissue swelling of chest wall/Pain of right clavicle, new  -     XR CHEST (2 VW); Future  Wants to hold off on thyroid/neck ultrasound. Reviewed last ct lung ca screening. Ice, ibuprofen for pain. SUBJECTIVE/OBJECTIVE:  HPI  Has pain in the right side of her neck with swelling. No known trauma. No dizziness blurred vision difficulty with speech weakness in arms or legs.   History of thyroid disease but this is the swelling is located below her neck at her clavicle   Subpleural nodular thickening noted along the medial, basilar pleura overlying the    right cardiophrenic fat pad.  A calcified granuloma is noted within the posterior right lower    lobe.  A tiny mm in size noncalcified granuloma is also noted at the left base.  No    consolidation, aggressive pulmonary nodules or lesions seen.  No pleural effusion.  The    tracheobronchial tree is normal in caliber.  No endobronchial lesions seen.       The heart is normal in size and contour.  No pericardial effusion noted.  The great vessels are    normal in caliber.  No coronary artery calcification noted.       No gross lymphadenopathy seen.  Calcified right hilar lymph nodes are identified.       The thyroid gland is normal in size, contour and density.  No gross lesions seen.       The supraclavicular soft tissues are grossly unremarkable.       Included images of the upper abdomen demonstrate punctate calcifications within the spleen    consistent with old granulomatous disease.       Retropectoral implants are in place.       The thoracic and paraspinal muscles are without abnormality.       The osseous structures are within normal limits.       CONCLUSION:   1. No acute cardiopulmonary pathology. 2. Evidence of old granulomatous disease.               Review of Systems   As above  Allergic/Immunologic: Negative for immunocompromised state. Psychiatric/Behavioral: Negative for agitation, behavioral problems and confusion. Physical Exam    Constitutional: [x] Appears well-developed and well-nourished [x] No apparent distress      [] Abnormal-   Mental status  [x] Alert and awake  [x] Oriented to person/place/time [x]Able to follow commands      Eyes:  EOM    [x]  Normal  [] Abnormal-  Sclera  [x]  Normal  [] Abnormal -         Discharge [x]  None visible  [] Abnormal -    HENT:   [x] Normocephalic, atraumatic.   [] Abnormal   [] Mouth/Throat: Mucous membranes are moist.     External Ears [x] Normal  [] Abnormal-     Neck: [x] No visualized mass     Pulmonary/Chest: [x] Respiratory effort normal.  [x] No visualized signs of difficulty breathing or respiratory distress        [] Abnormal-    Able to speak in full sentences without difficulty  Musculoskeletal:   [] Normal gait with no signs of ataxia         [x] Normal range of motion of neck        [x] Abnormal-chest wall: Edema at right clavicle distally at anterior chest    Neurological:        [x] No Facial Asymmetry (Cranial nerve 7 motor function) (limited exam to video visit)          [x] No gaze palsy        [] Abnormal-         Skin:        [x] No significant exanthematous lesions or discoloration noted on facial skin         [] Abnormal-            Psychiatric:       [x] Normal Affect [] No Hallucinations        [] Abnormal-   Judgment, behavior, thought and mood are normal.           (During AMDZZ-21 public health emergency), evaluation of the following organ systems was limited: Vitals/Constitutional/EENT/Resp/CV/GI//MS/Neuro/Skin/Heme-Lymph-Imm. Pursuant to the emergency declaration under the [address] [place] [address] and the [employer] and [employer] Act, this Virtual Visit was conducted with patient's (and/or legal guardian's) consent, to reduce the patient's risk of exposure to COVID-19 and provide necessary medical care. The patient (and/or legal guardian) has also been advised to contact this office for worsening conditions or problems, and seek emergency medical treatment and/or call 911 if deemed necessary. Patient initiated the encounter and gave consent for the encounter. Services were provided through a video synchronous discussion virtually to substitute for in-person clinic visit. Tylor Marshall was evaluated through a synchronous (real-time) audio-video encounter. The patient (or guardian if applicable) is aware that this is a billable service, which includes applicable co-pays. This Virtual Visit was conducted with patient's (and/or legal guardian's) consent. The visit was conducted pursuant to the emergency declaration under the [address] [place] [address] and the [employer] Act. Patient identification was verified, and a caregiver was present when appropriate. The patient was located in a state where the provider was licensed to provide care.

## 2022-06-30 ENCOUNTER — HOSPITAL ENCOUNTER (OUTPATIENT)
Dept: GENERAL RADIOLOGY | Age: 57
Discharge: HOME | End: 2022-06-30
Payer: COMMERCIAL

## 2022-06-30 DIAGNOSIS — R22.2 SOFT TISSUE SWELLING OF CHEST WALL: ICD-10-CM

## 2022-06-30 DIAGNOSIS — M79.89 SOFT TISSUE MASS: Primary | ICD-10-CM

## 2022-06-30 PROCEDURE — 71046 X-RAY EXAM CHEST 2 VIEWS: CPT

## 2022-07-01 ENCOUNTER — HOSPITAL ENCOUNTER (OUTPATIENT)
Dept: CT IMAGING | Age: 57
Discharge: HOME | End: 2022-07-01
Payer: COMMERCIAL

## 2022-07-01 ENCOUNTER — TELEPHONE (OUTPATIENT)
Dept: FAMILY MEDICINE CLINIC | Age: 57
End: 2022-07-01

## 2022-07-01 DIAGNOSIS — M79.89 SOFT TISSUE MASS: ICD-10-CM

## 2022-07-01 DIAGNOSIS — M79.89 SOFT TISSUE MASS: Primary | ICD-10-CM

## 2022-07-01 PROCEDURE — 71250 CT THORAX DX C-: CPT

## 2022-07-01 RX ORDER — METHYLPREDNISOLONE 4 MG/1
TABLET ORAL
Qty: 1 KIT | Refills: 0 | Status: SHIPPED | OUTPATIENT
Start: 2022-07-01 | End: 2022-07-20 | Stop reason: ALTCHOICE

## 2022-07-01 RX ORDER — AZITHROMYCIN 250 MG/1
TABLET, FILM COATED ORAL
Qty: 1 PACKET | Refills: 0 | Status: SHIPPED | OUTPATIENT
Start: 2022-07-01 | End: 2022-07-11

## 2022-07-01 NOTE — TELEPHONE ENCOUNTER
----- Message from Dianne Alicia sent at 7/1/2022  8:06 AM EDT -----  Subject: Message to Provider    QUESTIONS  Information for Provider? Emre Moeller needs her CT scan to say STAT from   Dr. Cachorro He she would like it done today. please contact pt when done.  ---------------------------------------------------------------------------  --------------  Scout Leos INFO  [phone]; OK to leave message on voicemail  ---------------------------------------------------------------------------  --------------  SCRIPT ANSWERS  Relationship to Patient?  Self

## 2022-07-12 ENCOUNTER — TELEMEDICINE (OUTPATIENT)
Dept: FAMILY MEDICINE CLINIC | Age: 57
End: 2022-07-12
Payer: COMMERCIAL

## 2022-07-12 DIAGNOSIS — M79.89 SOFT TISSUE MASS: Primary | ICD-10-CM

## 2022-07-12 PROCEDURE — G8427 DOCREV CUR MEDS BY ELIG CLIN: HCPCS | Performed by: FAMILY MEDICINE

## 2022-07-12 PROCEDURE — 3017F COLORECTAL CA SCREEN DOC REV: CPT | Performed by: FAMILY MEDICINE

## 2022-07-12 PROCEDURE — 99214 OFFICE O/P EST MOD 30 MIN: CPT | Performed by: FAMILY MEDICINE

## 2022-07-12 NOTE — PROGRESS NOTES
Asymmetric soft tissue thickening surrounding the right clavicular head at   the sternoclavicular joint. There is no osseous erosions. These findings may   reflect arthropathy, an infectious or inflammatory process and less likely a   malignant process. TELEHEALTH EVALUATION -- Audio/Visual (During [facility]-27 public health emergency)    HPI:  Joshua Weiss (:  1965) is a 62 y.o. female,  here for evaluation of the following chief complaint(s):  Results (CT results found Mass )      ASSESSMENT/PLAN:   Diagnosis Orders   1. Soft tissue mass  [facility] [place], [facility], [facility]    CBC    Sedimentation Rate     Adenike was seen today for results. Diagnoses and all orders for this visit:    Soft tissue mass, not improving  Will add bloodwork and consultation:  -     [facility] KarenJames Reynoso DO, Orthopedic Surgery, [facility]  -     CBC; Future  -     Sedimentation Rate; Future    ok to take 600-800mg ibuprofen every 8 hrs if 400mg not helping for pain    Reviewed ct result    SUBJECTIVE/OBJECTIVE:  HPI   Not much improvement with antibiotic and steroid. Not moving clavicle much because it can be painful, taking 400mg of ibuprofen which is helping some. Asymmetric soft tissue thickening surrounding the right clavicular head at   the sternoclavicular joint. There is no osseous erosions. These findings may   reflect arthropathy, an infectious or inflammatory process and less likely a   malignant process. Review of Systems   As above  Allergic/Immunologic: Negative for immunocompromised state. Psychiatric/Behavioral: Negative for agitation, behavioral problems and confusion.      Physical Exam    Constitutional: [x] Appears well-developed and well-nourished [x] No apparent distress      [] Abnormal-   Mental status  [x] Alert and awake  [x] Oriented to person/place/time [x]Able to follow commands Eyes:  EOM    [x]  Normal  [] Abnormal-  Sclera  [x]  Normal  [] Abnormal -         Discharge [x]  None visible  [] Abnormal -    HENT:   [x] Normocephalic, atraumatic. [] Abnormal   [] Mouth/Throat: Mucous membranes are moist.     External Ears [x] Normal  [] Abnormal-     Neck: [x] No visualized mass     Pulmonary/Chest: [x] Respiratory effort normal.  [x] No visualized signs of difficulty breathing or respiratory distress        [] Abnormal-    Able to speak in full sentences without difficulty  Musculoskeletal:   [] Normal gait with no signs of ataxia         [x] Normal range of motion of neck        [x] Abnormal- swelling noted at right clavicular sternal jt, somewhat smaller in size but hard to compare due to video visit. Neurological:        [x] No Facial Asymmetry (Cranial nerve 7 motor function) (limited exam to video visit)          [x] No gaze palsy        [] Abnormal-         Skin:        [x] No significant exanthematous lesions or discoloration noted on facial skin         [] Abnormal-            Psychiatric:       [x] Normal Affect [] No Hallucinations        [] Abnormal-   Judgment, behavior, thought and mood are normal.           (During [facility]-43 public health emergency), evaluation of the following organ systems was limited: Vitals/Constitutional/EENT/Resp/CV/GI//MS/Neuro/Skin/Heme-Lymph-Imm. Pursuant to the emergency declaration under the [facility], [address] [place] [address] authority and the [employer] and [employer] Act, this Virtual Visit was conducted with patient's (and/or legal guardian's) consent, to reduce the patient's risk of exposure to COVID-19 and provide necessary medical care. The patient (and/or legal guardian) has also been advised to contact this office for worsening conditions or problems, and seek emergency medical treatment and/or call 911 if deemed necessary.   Patient initiated the encounter and gave consent for the encounter. Services were provided through a video synchronous discussion virtually to substitute for in-person clinic visit. Kimberly Sanz, was evaluated through a synchronous (real-time) audio-video encounter. The patient (or guardian if applicable) is aware that this is a billable service, which includes applicable co-pays. This Virtual Visit was conducted with patient's (and/or legal guardian's) consent. The visit was conducted pursuant to the emergency declaration under the [address] [place] [address] [facility] authority and the [employer] Act. Patient identification was verified, and a caregiver was present when appropriate. The patient was located in a state where the provider was licensed to provide care.

## 2022-07-12 NOTE — PATIENT INSTRUCTIONS
Go to office lab, and get lab drawn. Call ortho:  [address] [place] [address] and [address], [place] [address] [address] [facility], [address]   Phone: [phone]    You can see any ortho available in your area if Dr Alee Leggett is not available. and you can follow up with rheumatology:  [address] [place] [address]   [place], [address] [place] [address]   [phone] phone   Or [place]:  MD Augusto   [facility]   [place], [address] [place] [address]   Phone: [phone]    Let me know if you have any trouble scheduling.

## 2022-07-13 DIAGNOSIS — M79.89 SOFT TISSUE MASS: ICD-10-CM

## 2022-07-13 DIAGNOSIS — R22.2 SOFT TISSUE SWELLING OF CHEST WALL: ICD-10-CM

## 2022-07-13 DIAGNOSIS — R22.2 SOFT TISSUE SWELLING OF CHEST WALL: Primary | ICD-10-CM

## 2022-07-13 LAB
BASOPHILS ABSOLUTE: 0 K/UL (ref 0–0.2)
BASOPHILS RELATIVE PERCENT: 0.7 %
EOSINOPHILS ABSOLUTE: 0.2 K/UL (ref 0–0.6)
EOSINOPHILS RELATIVE PERCENT: 5.8 %
HCT VFR BLD CALC: 36.1 % (ref 36–48)
HEMOGLOBIN: 12.2 G/DL (ref 12–16)
LYMPHOCYTES ABSOLUTE: 1.4 K/UL (ref 1–5.1)
LYMPHOCYTES RELATIVE PERCENT: 36.3 %
MCH RBC QN AUTO: 31.1 PG (ref 26–34)
MCHC RBC AUTO-ENTMCNC: 33.8 G/DL (ref 31–36)
MCV RBC AUTO: 92.1 FL (ref 80–100)
MONOCYTES ABSOLUTE: 0.3 K/UL (ref 0–1.3)
MONOCYTES RELATIVE PERCENT: 8.4 %
NEUTROPHILS ABSOLUTE: 1.9 K/UL (ref 1.7–7.7)
NEUTROPHILS RELATIVE PERCENT: 48.8 %
PDW BLD-RTO: 12.6 % (ref 12.4–15.4)
PLATELET # BLD: 241 K/UL (ref 135–450)
PMV BLD AUTO: 7.4 FL (ref 5–10.5)
RBC # BLD: 3.92 M/UL (ref 4–5.2)
SEDIMENTATION RATE, ERYTHROCYTE: 9 MM/HR (ref 0–30)
WBC # BLD: 3.9 K/UL (ref 4–11)

## 2022-07-20 ENCOUNTER — OFFICE VISIT (OUTPATIENT)
Dept: ORTHOPEDIC SURGERY | Age: 57
End: 2022-07-20
Payer: COMMERCIAL

## 2022-07-20 VITALS — HEIGHT: 67 IN | RESPIRATION RATE: 16 BRPM | BODY MASS INDEX: 21.93 KG/M2

## 2022-07-20 DIAGNOSIS — M89.8X1 PAIN OF RIGHT CLAVICLE: Primary | ICD-10-CM

## 2022-07-20 PROCEDURE — G8420 CALC BMI NORM PARAMETERS: HCPCS | Performed by: ORTHOPAEDIC SURGERY

## 2022-07-20 PROCEDURE — 99203 OFFICE O/P NEW LOW 30 MIN: CPT | Performed by: ORTHOPAEDIC SURGERY

## 2022-07-20 PROCEDURE — G8427 DOCREV CUR MEDS BY ELIG CLIN: HCPCS | Performed by: ORTHOPAEDIC SURGERY

## 2022-07-20 NOTE — PROGRESS NOTES
CHIEF COMPLAINT: Right shoulder pain    History:    Julio Edwards is a 62 y.o. right handed female referred by Kenny Mendez DO for Sports Medicine consultation for evaluation and treatment of Right shoulder pain. This is evaluated as a personal injury. The pain began 3-1/2 weeks ago. Pain is rated as a 4/10. There was not an injury. Pain is located mostly at her [place] joint. It does radiate laterally along her clavicle and she does complain of some shoulder pain also. She had swelling at her right SC joint. This is since improved. Pain is worse with pushing, adduction, and reaching forward. The patient has not had PT. The patient has not had an injection. The patient has tried NSAIDs. She has been prescribed an oral steroid. The patient has not tried ice. 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 Past Medical History:   Diagnosis Date    Hypothyroidism        Past Surgical History:   Procedure Laterality Date    APPENDECTOMY      BREAST SURGERY      Mammoplasty       Current Outpatient Medications on File Prior to Visit   Medication Sig Dispense Refill    methylPREDNISolone (MEDROL DOSEPACK) 4 MG tablet Take by mouth. 1 kit 0    Coenzyme Q10 (CO Q 10 PO) Take by mouth      zinc gluconate 50 MG tablet Take 50 mg by mouth daily      Multiple Vitamin (MULTIVITAMIN ADULT PO) Take 1 tablet by mouth daily      fish oil-omega-3 fatty acids 1000 MG capsule Take 2 g by mouth daily      vitamin D (ERGOCALCIFEROL) 1.25 MG (18714 UT) CAPS capsule Take by mouth      CALCIUM ASCORBATE PO Take by mouth      triamcinolone (KENALOG) 0.025 % cream Apply topically 2 times daily as needed for rash,  up to 2 weeks, . (Patient not taking: Reported on 6/29/2022) 60 g 0    levothyroxine (SYNTHROID) 25 MCG tablet TAKE 1 TABLET BY MOUTH ONCE DAILY       No current facility-administered medications on file prior to visit.        Allergies   Allergen Reactions    Sulfa Antibiotics Other (See Comments) Social History     Socioeconomic History    Marital status:      Spouse name: Not on file    Number of children: Not on file    Years of education: Not on file    Highest education level: Not on file   Occupational History    Not on file   Tobacco Use    Smoking status: Former     Packs/day: 0.50     Years: 10.00     Pack years: 5.00     Types: Cigarettes     Quit date: 2002     Years since quittin.5    Smokeless tobacco: Never   Vaping Use    Vaping Use: Never used   Substance and Sexual Activity    Alcohol use: Not Currently    Drug use: Never    Sexual activity: Not Currently   Other Topics Concern    Not on file   Social History Narrative    Not on file     Social Determinants of Health     Financial Resource Strain: Low Risk     Difficulty of Paying Living Expenses: Not hard at all   Food Insecurity: No Food Insecurity    Worried About Running Out of Food in the Last Year: Never true    Ran Out of Food in the Last Year: Never true   Transportation Needs: Not on file   Physical Activity: Not on file   Stress: Not on file   Social Connections: Not on file   Intimate Partner Violence: Not on file   Housing Stability: Not on file       No family history on file. Physical Examination:      Vital signs:  Resp 16   Ht 5' 7\" (1.702 m)   BMI 21.93 kg/m²     General:   alert, appears stated age, cooperative, and no distress   Right Shoulder   Active ROM:   forward flexion 180, external rotation 80, internal rotation L4.     Bilateral shoulders   Joint Tenderness:   Mild to moderate at right SC joint   Neer:   negative   García:   negative   Strength:   5/5 Supraspinatus, External rotation    Bilateral shoulders   Drop-arm test:   negative   Belly-press test:   negative   Bear-hug test:   negative   Speed's test:   negative   Bicipital groove tenderness:  negative   Philip's test:   negative   Cross-body adduction test:   negative    AC joint tenderness:   positive     There is minimal swelling overlying her right SC joint. There are no skin lesions, cellulitis, or extreme edema in the upper extremities. Sensation is grossly intact to light touch bilaterally upper extremity. The patient has warm and well-perfused bilateral upper extremities with brisk capillary refill. Imaging   Right shoulder xray: 3 views of the SC joint obtained and reviewed. No fracture, dislocation, lytic lesion. CT Chest 7/1/22:  Asymmetric soft tissue thickening surrounding the right clavicular head at   the sternoclavicular joint. There is no osseous erosions. These findings may   reflect arthropathy, an infectious or inflammatory process and less likely a   malignant process   She does appear to have some right glenohumeral narrowing seen on the CT scan.       CBC 7/13/22:  Component Ref Range & Units 7/13/22 0840 9/3/21 0847 5/28/11 0944   WBC 4.0 - 11.0 K/uL 3.9 Low   4.3  6.8 R    RBC 4.00 - 5.20 M/uL 3.92 Low   4.14  4.03 R    Hemoglobin 12.0 - 16.0 g/dL 12.2  13.0  12.8 R    Hematocrit 36.0 - 48.0 % 36.1  37.5  38.6    MCV 80.0 - 100.0 fL 92.1  90.6  95.7 R    MCH 26.0 - 34.0 pg 31.1  31.4  31.6 R    MCHC 31.0 - 36.0 g/dL 33.8  34.6  33.1 R    RDW 12.4 - 15.4 % 12.6  13.3  12.7 R    Platelets 541 - 216 K/uL 241  244  290 R    MPV 5.0 - 10.5 fL 7.4  7.7  8.1 R    Neutrophils % % 48.8      Lymphocytes % % 36.3   28.4 R    Monocytes % % 8.4   6.1 R    Eosinophils % % 5.8      Basophils % % 0.7   0.3 R    Neutrophils Absolute 1.7 - 7.7 K/uL 1.9      Lymphocytes Absolute 1.0 - 5.1 K/uL 1.4   1.9 R    Monocytes Absolute 0.0 - 1.3 K/uL 0.3   0.4 R    Eosinophils Absolute 0.0 - 0.6 K/uL 0.2   0.1 R    Basophils Absolute 0.0 - 0.2 K/uL 0.0   0.0 R    Segs Relative    63.9 High  R    Eosinophils %    1.3 R    GRANULOCYTE ABSOLUTE COUNT    4.3 R        ESR 7/13/22: 9        Assessment:      Right SC joint pain/swelling        Plan:      Discussed that on the CT scan, she does not have any significant narrowing of her

## 2022-08-03 ENCOUNTER — TELEPHONE (OUTPATIENT)
Dept: FAMILY MEDICINE CLINIC | Age: 57
End: 2022-08-03

## 2022-08-05 RX ORDER — LEVOTHYROXINE SODIUM 0.03 MG/1
TABLET ORAL
Qty: 30 TABLET | Refills: 2 | Status: SHIPPED | OUTPATIENT
Start: 2022-08-05 | End: 2022-11-04 | Stop reason: SDUPTHER

## 2022-08-05 NOTE — TELEPHONE ENCOUNTER
The pt was called and she is scheduled for   Future Appointments   Date Time Provider Kenrick Armendariz   11/4/2022  7:40 AM DO Gokul Hanna

## 2022-11-02 ENCOUNTER — TELEPHONE (OUTPATIENT)
Dept: FAMILY MEDICINE CLINIC | Age: 57
End: 2022-11-02

## 2022-11-02 DIAGNOSIS — M79.89 SOFT TISSUE MASS: ICD-10-CM

## 2022-11-02 DIAGNOSIS — E03.9 HYPOTHYROIDISM, UNSPECIFIED TYPE: ICD-10-CM

## 2022-11-02 DIAGNOSIS — E03.9 HYPOTHYROIDISM, UNSPECIFIED TYPE: Primary | ICD-10-CM

## 2022-11-02 LAB
HCT VFR BLD CALC: 34.7 % (ref 36–48)
HEMOGLOBIN: 12.1 G/DL (ref 12–16)
MCH RBC QN AUTO: 31 PG (ref 26–34)
MCHC RBC AUTO-ENTMCNC: 34.7 G/DL (ref 31–36)
MCV RBC AUTO: 89.4 FL (ref 80–100)
PDW BLD-RTO: 12.7 % (ref 12.4–15.4)
PLATELET # BLD: 242 K/UL (ref 135–450)
PMV BLD AUTO: 8.3 FL (ref 5–10.5)
RBC # BLD: 3.88 M/UL (ref 4–5.2)
WBC # BLD: 3.8 K/UL (ref 4–11)

## 2022-11-02 NOTE — TELEPHONE ENCOUNTER
Pt went to have her lab work done for her appt on   Future Appointments   Date Time Provider Kenrick Armendariz   11/4/2022  7:40 AM Cindy Kilgore DO [place] [address]     She was told the only order in the system was for CBC. Pt thought she was have blood work done to check her Thyroid. The lab told her to call and see if the order can be put in as they did draw the blood.

## 2022-11-03 DIAGNOSIS — D72.819 LEUKOPENIA, UNSPECIFIED TYPE: Primary | ICD-10-CM

## 2022-11-03 LAB — TSH REFLEX FT4: 2.27 UIU/ML (ref 0.27–4.2)

## 2022-11-03 NOTE — PROGRESS NOTES
11/04/22      Noel Staples  Chief Complaint   Patient presents with    Hypothyroidism            Diagnosis Orders   1. Hypothyroidism, unspecified type  levothyroxine (SYNTHROID) 25 MCG tablet      2. Viral URI  benzonatate (TESSALON PERLES) 100 MG capsule      3. Leukopenia, unspecified type        4. Screen for colon cancer  COLONOSCOPY W/ OR W/O BIOPSY      5. Encounter for screening mammogram for malignant neoplasm of breast  [facility]  [address] was seen today for hypothyroidism. Diagnoses and all orders for this visit:    Hypothyroidism, unspecified type  Lab reviewed  -     levothyroxine (SYNTHROID) 25 MCG tablet; TAKE 1 TABLET BY MOUTH ONCE DAILY    Viral URI    -     benzonatate (TESSALON PERLES) 100 MG capsule; Take 1 capsule by mouth 3 times daily as needed for Cough    Leukopenia, unspecified type, persistent  May be due to frequent viral infections, or other  Screen for colon cancer  -     COLONOSCOPY W/ OR W/O BIOPSY    Encounter for screening mammogram for malignant neoplasm of breast  -     [facility] DIGITAL SCREEN W OR WO CAD BILATERAL; Future      HPI  Coughing, gets sick from grandkids whom she cares for. Currently on 25mcg levothyroxine, TSH is in the normal range. White blood cell count is still low. I recommend a hematology consultation. Give her the contact info to schedule with [facility] in [facility], she wanted to wait until today to discuss  Had soft tissue thickening in July. Reviewed imaging, consult. Lab Results   Component Value Date    TSHFT4 2.27 11/02/2022    TSH 5.63 (H) 05/28/2011     Vitals:    11/04/22 0646   BP: 121/84   Site: Left Upper Arm   Position: Sitting   Cuff Size: Medium Adult   Pulse: 71   Temp: 97.7 °F (36.5 °C)   SpO2: 97%   Height: 5' 7\" (1.702 m)     Body mass index is 21.93 kg/m².      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   Tobacco Use    Smoking status: Former     Packs/day: 0.50     Years: 10.00     Pack years: 5.00     Types: Cigarettes     Start date: 12     Quit date: 2002     Years since quittin.8    Smokeless tobacco: Never   Vaping Use    Vaping Use: Never used   Substance and Sexual Activity    Alcohol use: Not Currently    Drug use: Never    Sexual activity: Not Currently   Other Topics Concern    Not on file   Social History Narrative    Not on file     Social Determinants of Health     Financial Resource Strain: Low Risk     Difficulty of Paying Living Expenses: Not very hard   Food Insecurity: No Food Insecurity    Worried About Running Out of Food in the Last Year: Never true    Ran Out of Food in the Last Year: Never true   Transportation Needs: Not on file   Physical Activity: Not on file   Stress: Not on file   Social Connections: Not on file   Intimate Partner Violence: Not on file   Housing Stability: Not on file           Current Outpatient Medications   Medication Sig Dispense Refill    levothyroxine (SYNTHROID) 25 MCG tablet TAKE 1 TABLET BY MOUTH ONCE DAILY 30 tablet 11    benzonatate (TESSALON PERLES) 100 MG capsule Take 1 capsule by mouth 3 times daily as needed for Cough 30 capsule 0    Coenzyme Q10 (CO Q 10 PO) Take by mouth      zinc gluconate 50 MG tablet Take 50 mg by mouth daily      Multiple Vitamin (MULTIVITAMIN ADULT PO) Take 1 tablet by mouth daily      fish oil-omega-3 fatty acids 1000 MG capsule Take 2 g by mouth daily      vitamin D (ERGOCALCIFEROL) 1.25 MG (11432 UT) CAPS capsule Take by mouth      CALCIUM ASCORBATE PO Take by mouth triamcinolone (KENALOG) 0.025 % cream Apply topically 2 times daily as needed for rash,  up to 2 weeks, . (Patient taking differently: Apply topically 2 times daily as needed for rash,  up to 2 weeks, .) 60 g 0     No current facility-administered medications for this visit. Vitals:    11/04/22 0646   BP: 121/84   Pulse: 71   Temp: 97.7 °F (36.5 °C)   SpO2: 97%     BP Readings from Last 5 Encounters:   11/04/22 121/84   05/27/22 130/84   03/04/22 128/84   09/03/21 118/82       ROS:  No fever or chills  No unexpected wt loss  No headaches  No chest pain  No shortness of breath  No nausea, vomiting or diarrhea  No loss of coordination. Physical Exam  Well developed well nourished patient   in no acute distress. Alert and oriented to person, place, and time. HEENT:Normocephalic, atraumatic. No scleral icterus. Pupils normal, no lymphadenopaty  Heart: Regular Rate and Rhythm. Respirations: lungs clear to auscultation bilaterally. no wheeze, occcassional cough  Abdomin: Bowel sounds present. No masses  Extremities: No peripheral edema. No erythema. Time spent today  for this patient visit may include the following:   time spent preparing to see the patient  Including review of tests, labs and imaging,   revewing previous history and recent encounters,   obtaining and/or reviewing separately obtained history in care everywhere,  performing a medically appropriate examination and/or evaluation;   counseling and educating the patient and /family/caregiver when present,  ordering medications, tests, or procedures;   referring to other health care specialists;   documenting clinical information in the electronic health record;   independently interpreting results (not separately reported)   and communicating results to the patient. An electronic signature was used to authenticate this note. This was dictated. Errors mightbe possible due to dictation.   --Adry Haynes,

## 2022-11-04 ENCOUNTER — OFFICE VISIT (OUTPATIENT)
Dept: FAMILY MEDICINE CLINIC | Age: 57
End: 2022-11-04
Payer: COMMERCIAL

## 2022-11-04 VITALS
HEIGHT: 67 IN | BODY MASS INDEX: 21.93 KG/M2 | OXYGEN SATURATION: 97 % | DIASTOLIC BLOOD PRESSURE: 84 MMHG | HEART RATE: 71 BPM | TEMPERATURE: 97.7 F | SYSTOLIC BLOOD PRESSURE: 121 MMHG

## 2022-11-04 DIAGNOSIS — J06.9 VIRAL URI: ICD-10-CM

## 2022-11-04 DIAGNOSIS — D72.819 LEUKOPENIA, UNSPECIFIED TYPE: ICD-10-CM

## 2022-11-04 DIAGNOSIS — E03.9 HYPOTHYROIDISM, UNSPECIFIED TYPE: Primary | ICD-10-CM

## 2022-11-04 DIAGNOSIS — Z12.31 ENCOUNTER FOR SCREENING MAMMOGRAM FOR MALIGNANT NEOPLASM OF BREAST: ICD-10-CM

## 2022-11-04 DIAGNOSIS — Z12.11 SCREEN FOR COLON CANCER: ICD-10-CM

## 2022-11-04 PROCEDURE — 3017F COLORECTAL CA SCREEN DOC REV: CPT | Performed by: FAMILY MEDICINE

## 2022-11-04 PROCEDURE — 99214 OFFICE O/P EST MOD 30 MIN: CPT | Performed by: FAMILY MEDICINE

## 2022-11-04 PROCEDURE — G8427 DOCREV CUR MEDS BY ELIG CLIN: HCPCS | Performed by: FAMILY MEDICINE

## 2022-11-04 PROCEDURE — G8420 CALC BMI NORM PARAMETERS: HCPCS | Performed by: FAMILY MEDICINE

## 2022-11-04 PROCEDURE — 1036F TOBACCO NON-USER: CPT | Performed by: FAMILY MEDICINE

## 2022-11-04 PROCEDURE — G8484 FLU IMMUNIZE NO ADMIN: HCPCS | Performed by: FAMILY MEDICINE

## 2022-11-04 RX ORDER — BENZONATATE 100 MG/1
100 CAPSULE ORAL 3 TIMES DAILY PRN
Qty: 30 CAPSULE | Refills: 0 | Status: SHIPPED | OUTPATIENT
Start: 2022-11-04 | End: 2023-11-04

## 2022-11-04 RX ORDER — LEVOTHYROXINE SODIUM 0.03 MG/1
TABLET ORAL
Qty: 30 TABLET | Refills: 11 | Status: SHIPPED | OUTPATIENT
Start: 2022-11-04

## 2022-11-04 SDOH — ECONOMIC STABILITY: FOOD INSECURITY: WITHIN THE PAST 12 MONTHS, THE FOOD YOU BOUGHT JUST DIDN'T LAST AND YOU DIDN'T HAVE MONEY TO GET MORE.: NEVER TRUE

## 2022-11-04 SDOH — ECONOMIC STABILITY: FOOD INSECURITY: WITHIN THE PAST 12 MONTHS, YOU WORRIED THAT YOUR FOOD WOULD RUN OUT BEFORE YOU GOT MONEY TO BUY MORE.: NEVER TRUE

## 2022-11-04 ASSESSMENT — SOCIAL DETERMINANTS OF HEALTH (SDOH): HOW HARD IS IT FOR YOU TO PAY FOR THE VERY BASICS LIKE FOOD, HOUSING, MEDICAL CARE, AND HEATING?: NOT VERY HARD

## 2022-11-18 ENCOUNTER — COMMUNITY OUTREACH (OUTPATIENT)
Dept: FAMILY MEDICINE CLINIC | Age: 57
End: 2022-11-18

## 2022-11-18 NOTE — PROGRESS NOTES
Patient's HM shows they are overdue for Mammogram Screening. [employer] and  files searched. No results to attach to order nor HM updated.

## 2022-11-21 ENCOUNTER — TELEMEDICINE (OUTPATIENT)
Dept: FAMILY MEDICINE CLINIC | Age: 57
End: 2022-11-21
Payer: COMMERCIAL

## 2022-11-21 DIAGNOSIS — J32.9 SINOBRONCHITIS: Primary | ICD-10-CM

## 2022-11-21 DIAGNOSIS — J40 SINOBRONCHITIS: Primary | ICD-10-CM

## 2022-11-21 PROCEDURE — 3017F COLORECTAL CA SCREEN DOC REV: CPT | Performed by: NURSE PRACTITIONER

## 2022-11-21 PROCEDURE — G8420 CALC BMI NORM PARAMETERS: HCPCS | Performed by: NURSE PRACTITIONER

## 2022-11-21 PROCEDURE — G8484 FLU IMMUNIZE NO ADMIN: HCPCS | Performed by: NURSE PRACTITIONER

## 2022-11-21 PROCEDURE — G8427 DOCREV CUR MEDS BY ELIG CLIN: HCPCS | Performed by: NURSE PRACTITIONER

## 2022-11-21 PROCEDURE — 99213 OFFICE O/P EST LOW 20 MIN: CPT | Performed by: NURSE PRACTITIONER

## 2022-11-21 PROCEDURE — 1036F TOBACCO NON-USER: CPT | Performed by: NURSE PRACTITIONER

## 2022-11-21 RX ORDER — AZITHROMYCIN 250 MG/1
250 TABLET, FILM COATED ORAL SEE ADMIN INSTRUCTIONS
Qty: 6 TABLET | Refills: 0 | Status: SHIPPED | OUTPATIENT
Start: 2022-11-21 | End: 2022-11-26

## 2022-11-21 RX ORDER — FLUTICASONE PROPIONATE 50 MCG
SPRAY, SUSPENSION (ML) NASAL
Qty: 16 G | Refills: 0 | Status: SHIPPED | OUTPATIENT
Start: 2022-11-21

## 2022-11-21 RX ORDER — DEXTROMETHORPHAN HYDROBROMIDE AND PROMETHAZINE HYDROCHLORIDE 15; 6.25 MG/5ML; MG/5ML
5 SYRUP ORAL 4 TIMES DAILY PRN
Qty: 240 ML | Refills: 0 | Status: SHIPPED | OUTPATIENT
Start: 2022-11-21

## 2022-11-21 ASSESSMENT — PATIENT HEALTH QUESTIONNAIRE - PHQ9
SUM OF ALL RESPONSES TO PHQ QUESTIONS 1-9: 0
2. FEELING DOWN, DEPRESSED OR HOPELESS: 0
SUM OF ALL RESPONSES TO PHQ QUESTIONS 1-9: 0
1. LITTLE INTEREST OR PLEASURE IN DOING THINGS: 0
SUM OF ALL RESPONSES TO PHQ9 QUESTIONS 1 & 2: 0
SUM OF ALL RESPONSES TO PHQ QUESTIONS 1-9: 0
SUM OF ALL RESPONSES TO PHQ QUESTIONS 1-9: 0

## 2022-11-21 ASSESSMENT — ENCOUNTER SYMPTOMS
SHORTNESS OF BREATH: 0
WHEEZING: 0
COUGH: 1
SINUS PAIN: 1
SINUS PRESSURE: 1
CHEST TIGHTNESS: 1
DIARRHEA: 0
SORE THROAT: 1
NAUSEA: 0
VOICE CHANGE: 0
TROUBLE SWALLOWING: 0
RHINORRHEA: 0

## 2022-11-21 ASSESSMENT — ANXIETY QUESTIONNAIRES
5. BEING SO RESTLESS THAT IT IS HARD TO SIT STILL: 0
4. TROUBLE RELAXING: 0
IF YOU CHECKED OFF ANY PROBLEMS ON THIS QUESTIONNAIRE, HOW DIFFICULT HAVE THESE PROBLEMS MADE IT FOR YOU TO DO YOUR WORK, TAKE CARE OF THINGS AT HOME, OR GET ALONG WITH OTHER PEOPLE: NOT DIFFICULT AT ALL
2. NOT BEING ABLE TO STOP OR CONTROL WORRYING: 0
3. WORRYING TOO MUCH ABOUT DIFFERENT THINGS: 0
GAD7 TOTAL SCORE: 0
1. FEELING NERVOUS, ANXIOUS, OR ON EDGE: 0
7. FEELING AFRAID AS IF SOMETHING AWFUL MIGHT HAPPEN: 0
6. BECOMING EASILY ANNOYED OR IRRITABLE: 0

## 2022-11-21 NOTE — PROGRESS NOTES
2022    TELEHEALTH EVALUATION -- Audio/Visual (During TBOEB-97 public health emergency)    HPI:    Sukhwinder Herrera (:  1965) has requested an audio/video evaluation for the following concern(s):    Chief Complaint   Patient presents with    Sinus Problem    Cough     Some productive coughing     Nasal Congestion    Ear Fullness     Right side lungs and chest feels full        Vicki Maria is seen today for illness. She notes sinus pressure, congestion, post nasal drip, cough, otalgia, and chest tightness. The right side feels worse than the left. Symptoms started last week and have been progressively worsening. She has been using otc medications without relief of symptoms. She believes she got it from her daughter who was sick with similar symptoms last week. She has not tested for covid. She does not believe it is flu. States it is very similar to the last time she had walking pneumonia        Review of Systems   Constitutional:  Positive for activity change and fatigue. Negative for fever and unexpected weight change. HENT:  Positive for congestion, ear pain (right), postnasal drip, sinus pressure, sinus pain (right) and sore throat. Negative for rhinorrhea, trouble swallowing and voice change. Respiratory:  Positive for cough and chest tightness. Negative for shortness of breath and wheezing. Cardiovascular:  Negative for chest pain, palpitations and leg swelling. Gastrointestinal:  Negative for diarrhea and nausea. Genitourinary: Negative. Musculoskeletal:  Positive for myalgias. Skin: Negative. Neurological:  Positive for headaches. Psychiatric/Behavioral:  Positive for sleep disturbance (cough keepoing her up). Negative for suicidal ideas. Prior to Visit Medications    Medication Sig Taking?  Authorizing Provider   levothyroxine (SYNTHROID) 25 MCG tablet TAKE 1 TABLET BY MOUTH ONCE DAILY Yes Isamar Araiza DO   Coenzyme Q10 (CO Q 10 PO) Take by mouth Yes Historical Provider, MD zinc gluconate 50 MG tablet Take 50 mg by mouth daily Yes Historical Provider, MD   Multiple Vitamin (MULTIVITAMIN ADULT PO) Take 1 tablet by mouth daily Yes Historical Provider, MD   fish oil-omega-3 fatty acids 1000 MG capsule Take 2 g by mouth daily Yes Historical Provider, MD   vitamin D (ERGOCALCIFEROL) 1.25 MG (60152 UT) CAPS capsule Take by mouth Yes Historical Provider, MD   CALCIUM ASCORBATE PO Take by mouth Yes Historical Provider, MD   triamcinolone (KENALOG) 0.025 % cream Apply topically 2 times daily as needed for rash,  up to 2 weeks, . Patient taking differently: Apply topically 2 times daily as needed for rash,  up to 2 weeks, . Yes Yury Najera DO   benzonatate (TESSALON PERLES) 100 MG capsule Take 1 capsule by mouth 3 times daily as needed for Cough  Patient not taking: Reported on 2022  Yury Najera DO       Social History     Tobacco Use    Smoking status: Former     Packs/day: 0.50     Years: 10.00     Pack years: 5.00     Types: Cigarettes     Start date:      Quit date: 2002     Years since quittin.9    Smokeless tobacco: Never   Vaping Use    Vaping Use: Never used   Substance Use Topics    Alcohol use: Not Currently    Drug use: Never        Allergies   Allergen Reactions    Sulfa Antibiotics Other (See Comments)   ,   Past Medical History:   Diagnosis Date    Hypothyroidism    ,   Past Surgical History:   Procedure Laterality Date    APPENDECTOMY      BREAST SURGERY      Mammoplasty       PHYSICAL EXAMINATION:  Patient-Reported Vitals 2022   Patient-Reported Weight (No Data)   Patient-Reported Height 5f7         Physical Exam  Constitutional:       General: She is not in acute distress. Appearance: Normal appearance. She is normal weight. She is ill-appearing. She is not toxic-appearing. HENT:      Head: Normocephalic and atraumatic. Right Ear: External ear normal.      Left Ear: External ear normal.      Nose: Nose normal. No rhinorrhea. 62 y.o. female  was evaluated through a synchronous (real-time) audio-video encounter. The patient (or guardian if applicable) is aware that this is a billable service, which includes applicable co-pays. This Virtual Visit was conducted with patient's (and/or legal guardian's) consent. The visit was conducted pursuant to the emergency declaration under the [address] [place] [address] and the [employer] General Act. Patient identification was verified, and a caregiver was present when appropriate. The patient was located in a state where the provider was licensed to provide care. Patient identification was verified at the start of the visit: Yes    Total time spent on this encounter: Not billed by time    Services were provided through a video synchronous discussion virtually to substitute for in-person clinic visit. Patient and provider were located at their individual homes. --TAM Dunn - CNP on 11/21/2022 at 3:22 PM    An electronic signature was used to authenticate this note.

## 2023-10-25 DIAGNOSIS — E03.9 HYPOTHYROIDISM, UNSPECIFIED TYPE: ICD-10-CM

## 2023-10-25 RX ORDER — LEVOTHYROXINE SODIUM 0.03 MG/1
TABLET ORAL
Qty: 30 TABLET | Refills: 0 | Status: SHIPPED | OUTPATIENT
Start: 2023-10-25

## 2023-11-08 ENCOUNTER — TELEMEDICINE (OUTPATIENT)
Dept: PRIMARY CARE CLINIC | Age: 58
End: 2023-11-08
Payer: COMMERCIAL

## 2023-11-08 DIAGNOSIS — R07.81 PLEURITIC PAIN: ICD-10-CM

## 2023-11-08 DIAGNOSIS — J32.9 SINOBRONCHITIS: Primary | ICD-10-CM

## 2023-11-08 DIAGNOSIS — J40 SINOBRONCHITIS: Primary | ICD-10-CM

## 2023-11-08 PROCEDURE — G8427 DOCREV CUR MEDS BY ELIG CLIN: HCPCS | Performed by: NURSE PRACTITIONER

## 2023-11-08 PROCEDURE — 3017F COLORECTAL CA SCREEN DOC REV: CPT | Performed by: NURSE PRACTITIONER

## 2023-11-08 PROCEDURE — 99213 OFFICE O/P EST LOW 20 MIN: CPT | Performed by: NURSE PRACTITIONER

## 2023-11-08 RX ORDER — AZITHROMYCIN 250 MG/1
250 TABLET, FILM COATED ORAL SEE ADMIN INSTRUCTIONS
Qty: 6 TABLET | Refills: 0 | Status: SHIPPED | OUTPATIENT
Start: 2023-11-08 | End: 2023-11-13

## 2023-11-08 RX ORDER — METHYLPREDNISOLONE 4 MG/1
TABLET ORAL
Qty: 1 KIT | Refills: 0 | Status: SHIPPED | OUTPATIENT
Start: 2023-11-08 | End: 2023-11-14

## 2023-11-08 RX ORDER — DEXTROMETHORPHAN HYDROBROMIDE AND PROMETHAZINE HYDROCHLORIDE 15; 6.25 MG/5ML; MG/5ML
5 SYRUP ORAL 4 TIMES DAILY PRN
Qty: 240 ML | Refills: 0 | Status: SHIPPED | OUTPATIENT
Start: 2023-11-08

## 2023-11-08 ASSESSMENT — ENCOUNTER SYMPTOMS
WHEEZING: 1
COUGH: 1

## 2023-11-08 NOTE — PROGRESS NOTES
Azul Chong (:  1965) is a Established patient, here for evaluation of the following:    Cough       Assessment & Plan:  Below is the assessment and plan developed based on review of pertinent history, physical exam, labs, studies, and medications. 1. Sinobronchitis  -     promethazine-dextromethorphan (PROMETHAZINE-DM) 6.25-15 MG/5ML syrup; Take 5 mLs by mouth 4 times daily as needed for Cough, Disp-240 mL, R-0Normal  -     azithromycin (ZITHROMAX) 250 MG tablet; Take 1 tablet by mouth See Admin Instructions for 5 days 500mg on day 1 followed by 250mg on days 2 - 5, Disp-6 tablet, R-0Normal  -     methylPREDNISolone (MEDROL DOSEPACK) 4 MG tablet; Take by mouth., Disp-1 kit, R-0Normal  2. Pleuritic pain  - Heating pad and ibuprofen OTC as directed  No follow-ups on file. The patient would benefit from future follow up with their usual PCP. As of the end of their Virtualist Visit today, follow up visit status is as follows: Patient to follow up as previously instructed by PCP    Subjective:   Patient gas had symptoms for 1 week. She has sinus drainage and some congestion. She has a cough and is wheezing. The coughing has made her lung feel painful. She has been taking delsym. She gets little to no relief. Her cough is mostly dry with occasional mucus. Advised her to follow up with PCP in person if she is still feeling poorly after treatment or worsens. Review of Systems   HENT:  Positive for congestion and postnasal drip. Respiratory:  Positive for cough and wheezing.         Objective:  Patient-Reported Vitals  No data recorded         2022     1:40 PM   Patient-Reported Vitals   Patient-Reported Height 5f7        Physical Exam:  [INSTRUCTIONS:  \"[x]\" Indicates a positive item  \"[]\" Indicates a negative item  -- DELETE ALL ITEMS NOT EXAMINED]    Constitutional: [x] Appears well-developed and well-nourished [x] No apparent distress      [] Abnormal -     Mental status: [x] Alert and awake  [x]
Principal Discharge DX:	Menorrhagia with irregular cycle

## 2023-11-28 ENCOUNTER — TELEPHONE (OUTPATIENT)
Dept: FAMILY MEDICINE CLINIC | Age: 58
End: 2023-11-28

## 2023-11-28 DIAGNOSIS — E03.9 HYPOTHYROIDISM, UNSPECIFIED TYPE: ICD-10-CM

## 2023-11-28 RX ORDER — LEVOTHYROXINE SODIUM 0.03 MG/1
25 TABLET ORAL DAILY
Qty: 30 TABLET | Refills: 0 | Status: SHIPPED | OUTPATIENT
Start: 2023-11-28

## 2023-11-28 NOTE — TELEPHONE ENCOUNTER
Patient called to request a refill of the following to be sent to [facility] in [place].      levothyroxine (SYNTHROID) 25 MCG tablet     11/4/2022 last ov    Future Appointments   Date Time Provider [address]   1/19/2024  8:00 AM DO STEFFANIE Westbrook [address]

## 2023-12-04 DIAGNOSIS — E03.9 HYPOTHYROIDISM, UNSPECIFIED TYPE: ICD-10-CM

## 2023-12-04 NOTE — TELEPHONE ENCOUNTER
Future Appointments   Date Time Provider [address]   1/19/2024  8:00 AM Iliana Foy DO [address] 11/4/2022

## 2023-12-05 RX ORDER — LEVOTHYROXINE SODIUM 0.03 MG/1
25 TABLET ORAL DAILY
Qty: 30 TABLET | Refills: 0 | Status: SHIPPED | OUTPATIENT
Start: 2023-12-05

## 2024-01-02 ENCOUNTER — COMMUNITY OUTREACH (OUTPATIENT)
Dept: FAMILY MEDICINE CLINIC | Age: 59
End: 2024-01-02

## 2024-01-02 NOTE — PROGRESS NOTES
Patient's HM shows they are overdue for Mammogram Screening.  Care Everywhere and  files searched.  No results to attach to order nor HM updated.

## 2024-01-03 DIAGNOSIS — E03.9 HYPOTHYROIDISM, UNSPECIFIED TYPE: ICD-10-CM

## 2024-01-03 RX ORDER — LEVOTHYROXINE SODIUM 0.03 MG/1
25 TABLET ORAL DAILY
Qty: 30 TABLET | Refills: 11 | Status: SHIPPED | OUTPATIENT
Start: 2024-01-03 | End: 2024-01-19 | Stop reason: SDUPTHER

## 2024-01-03 NOTE — TELEPHONE ENCOUNTER
11/4/2022        Future Appointments   Date Time Provider Department Center   1/19/2024  8:00 AM Maria G Aldrich DO [facility] [place] [facility] [place]

## 2024-01-17 NOTE — PROGRESS NOTES
2024    Adenike De Luna (:  1965) is a 58 y.o. female, here for a preventive medicine evaluation.  Chief Complaint   Patient presents with    Annual Exam        Diagnosis Orders   1. Annual physical exam  CBC    Comprehensive Metabolic Panel    Lipid Panel    TSH with Reflex to FT4      2. Hypothyroidism, unspecified type  TSH with Reflex to FT4    levothyroxine (SYNTHROID) 25 MCG tablet      3. Encounter for screening mammogram for malignant neoplasm of breast  [place] DIGITAL SCREEN W OR WO CAD BILATERAL      4. Right foot pain  [facility] [place]        Adenike was seen today for annual exam.    Diagnoses and all orders for this visit:    Annual physical exam  -     CBC; Future  -     Comprehensive Metabolic Panel; Future  -     Lipid Panel; Future  -     TSH with Reflex to FT4; Future    Hypothyroidism, unspecified type  -     TSH with Reflex to FT4; Future  -     levothyroxine (SYNTHROID) 25 MCG tablet; Take 1 tablet by mouth daily    Encounter for screening mammogram for malignant neoplasm of breast  -     [place] DIGITAL SCREEN W OR WO CAD BILATERAL; Future    Right foot pain  -     [facility] IV          There is no problem list on file for this patient.      Review of Systems   Constitutional: Negative for unexpected weight change.   HENT: Negative.    Eyes: Negative for redness.   Respiratory: Negative for shortness of breath.    Cardiovascular: Negative for chest pain and leg swelling.   Gastrointestinal: Negative for constipation, diarrhea, nausea and vomiting.   Skin: Negative for pallor.   Allergic/Immunologic: Negative for immunocompromised state.   Psychiatric/Behavioral: Negative for confusion.   Ext: prin right foot, osteoarthritis pain   Prior to Visit Medications    Medication Sig Taking? Authorizing Provider   levothyroxine (SYNTHROID) 25 MCG tablet Take 1 tablet by mouth daily Yes Maria G Aldrich, DO   Coenzyme Q10 (CO Q 10 PO) Take by mouth Yes

## 2024-01-19 ENCOUNTER — OFFICE VISIT (OUTPATIENT)
Dept: FAMILY MEDICINE CLINIC | Age: 59
End: 2024-01-19
Payer: COMMERCIAL

## 2024-01-19 VITALS
HEIGHT: 67 IN | SYSTOLIC BLOOD PRESSURE: 118 MMHG | RESPIRATION RATE: 14 BRPM | WEIGHT: 142 LBS | TEMPERATURE: 97.1 F | HEART RATE: 74 BPM | BODY MASS INDEX: 22.29 KG/M2 | DIASTOLIC BLOOD PRESSURE: 76 MMHG | OXYGEN SATURATION: 97 %

## 2024-01-19 DIAGNOSIS — Z00.00 ANNUAL PHYSICAL EXAM: Primary | ICD-10-CM

## 2024-01-19 DIAGNOSIS — Z12.31 ENCOUNTER FOR SCREENING MAMMOGRAM FOR MALIGNANT NEOPLASM OF BREAST: ICD-10-CM

## 2024-01-19 DIAGNOSIS — M79.671 RIGHT FOOT PAIN: ICD-10-CM

## 2024-01-19 DIAGNOSIS — E03.9 HYPOTHYROIDISM, UNSPECIFIED TYPE: ICD-10-CM

## 2024-01-19 PROCEDURE — 99396 PREV VISIT EST AGE 40-64: CPT | Performed by: FAMILY MEDICINE

## 2024-01-19 RX ORDER — LEVOTHYROXINE SODIUM 0.03 MG/1
25 TABLET ORAL DAILY
Qty: 30 TABLET | Refills: 11 | Status: SHIPPED | OUTPATIENT
Start: 2024-01-19

## 2024-01-26 DIAGNOSIS — Z00.00 ANNUAL PHYSICAL EXAM: ICD-10-CM

## 2024-01-26 DIAGNOSIS — E03.9 HYPOTHYROIDISM, UNSPECIFIED TYPE: ICD-10-CM

## 2024-01-26 LAB
ALBUMIN SERPL-MCNC: 4.4 G/DL (ref 3.4–5)
ALBUMIN/GLOB SERPL: 2.2 {RATIO} (ref 1.1–2.2)
ALP SERPL-CCNC: 76 U/L (ref 40–129)
ALT SERPL-CCNC: 11 U/L (ref 10–40)
ANION GAP SERPL CALCULATED.3IONS-SCNC: 10 MMOL/L (ref 3–16)
AST SERPL-CCNC: 20 U/L (ref 15–37)
BILIRUB SERPL-MCNC: 0.3 MG/DL (ref 0–1)
BUN SERPL-MCNC: 17 MG/DL (ref 7–20)
CALCIUM SERPL-MCNC: 9.2 MG/DL (ref 8.3–10.6)
CHLORIDE SERPL-SCNC: 106 MMOL/L (ref 99–110)
CHOLEST SERPL-MCNC: 209 MG/DL (ref 0–199)
CO2 SERPL-SCNC: 27 MMOL/L (ref 21–32)
CREAT SERPL-MCNC: 0.7 MG/DL (ref 0.6–1.1)
DEPRECATED RDW RBC AUTO: 12.6 % (ref 12.4–15.4)
GFR SERPLBLD CREATININE-BSD FMLA CKD-EPI: >60 ML/MIN/{1.73_M2}
GLUCOSE SERPL-MCNC: 97 MG/DL (ref 70–99)
HCT VFR BLD AUTO: 34.7 % (ref 36–48)
HDLC SERPL-MCNC: 52 MG/DL (ref 40–60)
HGB BLD-MCNC: 12.1 G/DL (ref 12–16)
LDLC SERPL CALC-MCNC: 135 MG/DL
MCH RBC QN AUTO: 31.1 PG (ref 26–34)
MCHC RBC AUTO-ENTMCNC: 34.9 G/DL (ref 31–36)
MCV RBC AUTO: 89.1 FL (ref 80–100)
PLATELET # BLD AUTO: 252 K/UL (ref 135–450)
PMV BLD AUTO: 7.7 FL (ref 5–10.5)
POTASSIUM SERPL-SCNC: 4.4 MMOL/L (ref 3.5–5.1)
PROT SERPL-MCNC: 6.4 G/DL (ref 6.4–8.2)
RBC # BLD AUTO: 3.89 M/UL (ref 4–5.2)
SODIUM SERPL-SCNC: 143 MMOL/L (ref 136–145)
T4 FREE SERPL-MCNC: 1 NG/DL (ref 0.9–1.8)
TRIGL SERPL-MCNC: 108 MG/DL (ref 0–150)
TSH SERPL DL<=0.005 MIU/L-ACNC: 4.64 UIU/ML (ref 0.27–4.2)
VLDLC SERPL CALC-MCNC: 22 MG/DL
WBC # BLD AUTO: 4.5 K/UL (ref 4–11)

## 2024-01-30 ENCOUNTER — HOSPITAL ENCOUNTER (OUTPATIENT)
Dept: PHYSICAL THERAPY | Age: 59
Setting detail: THERAPIES SERIES
Discharge: HOME | End: 2024-01-30
Payer: COMMERCIAL

## 2024-01-30 PROCEDURE — 97110 THERAPEUTIC EXERCISES: CPT

## 2024-01-30 PROCEDURE — 97161 PT EVAL LOW COMPLEX 20 MIN: CPT

## 2024-01-30 NOTE — PLAN OF CARE
[facility]- Outpatient Rehabilitation and Therapy [address], [place] [address] office: [phone] fax: [phone]     Physical Therapy Certification      Dear Maria G Aldrich DO,    We had the pleasure of evaluating the following patient for physical therapy services at [facility].  A summary of our findings can be found in the initial assessment below.  This includes our plan of care.  If you have any questions or concerns regarding these findings, please do not hesitate to contact me at the office phone number listed above.  Thank you for the referral.     Physician Signature:_______________________________Date:__________________  By signing above (or electronic signature), therapist’s plan is approved by physician       Physical Therapy: Initial Evaluation   Patient: Adenike De Luna (58 y.o. female)   Examination Date: 2024   :  1965 MRN: [id number]   Visit #: 1    Insurance: Payor: AETNA / Plan: AETNA / Product Type: *No Product type* /   Insurance ID: [id number] - (Commercial)  Secondary Insurance (if applicable):    Treatment Diagnosis: Right foot pain     Medical Diagnosis:  Right foot pain [M79.671]   Referring Physician: Maria G Aldrich DO  PCP: Maria G Aldrich DO                              Precautions/ Contra-indications:   Latex allergy:   [x] NO      [] YES   Pacemaker:     [x] NO      [] YES   Other:     C-SSRS Triggered by Intake questionnaire (Past 2 wk assessment):   [x] No, Questionnaire did not trigger screening.   [] Yes, Patient intake triggered further evaluation      [] C-SSRS Screening completed  [] PCP notified via Plan of Care  [] Emergency services notified     Preferred Language for Healthcare:   [x] English       [] other:    SUBJECTIVE EXAMINATION     Patient stated complaint: Pt states began having pain in foot of fall of . Pain is in the toes. Hurts to walk. Has arthritis    Imaging:

## 2024-01-30 NOTE — FLOWSHEET NOTE
[facility]- [facility] [address], [place] [address] office: [phone] fax: [phone]      Physical Therapy: TREATMENT/PROGRESS NOTE   Patient: Adenike De Luna (58 y.o. female)   Treatment Date: 2024   :  1965 MRN: [id number]   Visit #: 1    Insurance: Payor: AETNA / Plan: AETNA / Product Type: *No Product type* /   Insurance ID: [id number] - (Commercial)  Secondary Insurance (if applicable):    Treatment Diagnosis: Right foot pain   Medical Diagnosis:       Right foot pain [M79.671]   Referring Physician: Maria G Aldrich DO  PCP: Maria G Aldrich DO                             Plan of care signed (Y/N):     Date of Patient follow up with Physician:      Progress Report Due:     POC due: 24     Visit # Insurance Allowable Auth Needed    []Yes    [x]No     Latex Allergy:  [x]NO      []YES   Preferred Language for Healthcare:   [x]English       []other:    Assessment Summary: Adenike De Luna is a 58 y.o. female presenting today to Outpatient PT with primary complaint of right foot pain which has been occurring since . Pt is noted to have full ankle ROM and strength in open kinetic chain. Joint mobility is hypermobile. There are sami nodes on feet. May benefit from orthotics.     SUBJECTIVE EXAMINATION     Patient Report/Comments: see eval    OBJECTIVE EXAMINATION     Observation:     Test measurements:      Test used Initial score 2024   Pain Summary VAS 4    Functional questionnaire LEFS 28%                    RESTRICTIONS/PRECAUTIONS: None    Exercises/Interventions:     Therapeutic Ex (81308)/Neuro 10050  HEP 24            Warm-up                     TABLE                                                        SITTING       AP heel slides x x15     Toe curls x x30     Great toe extension x x20     INV/EVR towel slides x X15 ea     Heel raises x x20     Toe raises x x20

## 2024-06-14 ENCOUNTER — TELEPHONE (OUTPATIENT)
Dept: FAMILY MEDICINE CLINIC | Age: 59
End: 2024-06-14

## 2024-07-03 ENCOUNTER — NURSE ONLY (OUTPATIENT)
Dept: FAMILY MEDICINE CLINIC | Age: 59
End: 2024-07-03
Payer: COMMERCIAL

## 2024-07-03 DIAGNOSIS — Z01.818 PRE-OP EXAM: Primary | ICD-10-CM

## 2024-07-03 PROCEDURE — 93000 ELECTROCARDIOGRAM COMPLETE: CPT | Performed by: FAMILY MEDICINE

## 2025-01-09 ENCOUNTER — TELEPHONE (OUTPATIENT)
Dept: FAMILY MEDICINE CLINIC | Age: 60
End: 2025-01-09

## 2025-01-09 DIAGNOSIS — E03.9 HYPOTHYROIDISM, UNSPECIFIED TYPE: ICD-10-CM

## 2025-01-09 RX ORDER — LEVOTHYROXINE SODIUM 25 UG/1
25 TABLET ORAL DAILY
Qty: 30 TABLET | Refills: 11 | Status: SHIPPED | OUTPATIENT
Start: 2025-01-09

## 2025-01-09 NOTE — TELEPHONE ENCOUNTER
----- Message from Victoriano BUSTILLOS sent at 1/9/2025  9:36 AM EST -----  Regarding: [facility] Appointment Request  [facility] Appointment Request    Patient needs appointment for [facility] Appointment Type: Annual Visit.    Patient Requested Dates(s): As soon as possible   Patient Requested Time: Morning Hours   Provider Name: Maria G Aldrich DO     Reason for Appointment Request: New Patient - No appointments available during search  --------------------------------------------------------------------------------------------------------------------------    Relationship to Patient: Self     Call Back Information: OK to leave message on voicemail  Preferred Call Back Number: Phone [phone]    Other info: Patient wants to have his physical so she can get her new prescription.

## 2025-01-09 NOTE — TELEPHONE ENCOUNTER
Future Appointments   Date Time Provider Department Center   5/2/2025  8:00 AM Patsy Duran DO [facility] DEP

## 2025-01-09 NOTE — TELEPHONE ENCOUNTER
Patient needs a refill on Levothyroxine. They need a 30 day supply.     Mail order or local pharmacy: local    Pharmacy: [facility]    Last 1/19/24    Future Appointments   Date Time Provider Department Center   5/2/2025  8:00 AM Patsy Duran DO [facility] DEP

## 2025-01-22 ENCOUNTER — TELEMEDICINE (OUTPATIENT)
Age: 60
End: 2025-01-22
Payer: COMMERCIAL

## 2025-01-22 DIAGNOSIS — R06.2 WHEEZING: ICD-10-CM

## 2025-01-22 DIAGNOSIS — J06.9 VIRAL URI WITH COUGH: Primary | ICD-10-CM

## 2025-01-22 PROCEDURE — 99213 OFFICE O/P EST LOW 20 MIN: CPT | Performed by: NURSE PRACTITIONER

## 2025-01-22 RX ORDER — PREDNISONE 20 MG/1
20 TABLET ORAL 2 TIMES DAILY
Qty: 10 TABLET | Refills: 0 | Status: SHIPPED | OUTPATIENT
Start: 2025-01-22 | End: 2025-01-27

## 2025-01-22 RX ORDER — DEXTROMETHORPHAN HYDROBROMIDE AND PROMETHAZINE HYDROCHLORIDE 15; 6.25 MG/5ML; MG/5ML
5 SYRUP ORAL
Qty: 118 ML | Refills: 0 | Status: SHIPPED | OUTPATIENT
Start: 2025-01-22 | End: 2025-02-15

## 2025-01-22 RX ORDER — BROMPHENIRAMINE MALEATE, PSEUDOEPHEDRINE HYDROCHLORIDE, AND DEXTROMETHORPHAN HYDROBROMIDE 2; 30; 10 MG/5ML; MG/5ML; MG/5ML
5-10 SYRUP ORAL 4 TIMES DAILY PRN
Qty: 200 ML | Refills: 0 | Status: SHIPPED | OUTPATIENT
Start: 2025-01-22

## 2025-01-22 ASSESSMENT — ENCOUNTER SYMPTOMS
COUGH: 1
CHEST TIGHTNESS: 0
SORE THROAT: 1
SHORTNESS OF BREATH: 0
WHEEZING: 1
VOICE CHANGE: 1
SINUS PRESSURE: 0
SINUS PAIN: 0

## 2025-01-22 NOTE — PROGRESS NOTES
Review of Systems   Constitutional:  Positive for chills (improving) and fatigue. Negative for diaphoresis and fever.   HENT:  Positive for sore throat and voice change. Negative for congestion, sinus pressure and sinus pain.    Respiratory:  Positive for cough and wheezing. Negative for chest tightness and shortness of breath.    Musculoskeletal:  Positive for myalgias.   Neurological:  Negative for headaches.       Objective:  Patient-Reported Vitals  No data recorded         11/21/2022     1:40 PM   Patient-Reported Vitals   Patient-Reported Weight --   Patient-Reported Height 5f7        Physical Exam:  [INSTRUCTIONS:  \"[x]\" Indicates a positive item  \"[]\" Indicates a negative item  -- DELETE ALL ITEMS NOT EXAMINED]    Constitutional: [x] Appears well-developed and well-nourished [x] No apparent distress      [] Abnormal -     Mental status: [x] Alert and awake  [x] Oriented to person/place/time [x] Able to follow commands    [] Abnormal -     Eyes:   EOM    [x]  Normal    [] Abnormal -   Sclera  [x]  Normal    [] Abnormal -          Discharge [x]  None visible   [] Abnormal -     HENT: [x] Normocephalic, atraumatic  [] Abnormal -   [x] Mouth/Throat: Mucous membranes are moist    External Ears [x] Normal  [] Abnormal -    Neck: [x] No visualized mass [] Abnormal -     Pulmonary/Chest: [x] Respiratory effort normal   [x] No visualized signs of difficulty breathing or respiratory distress        [] Abnormal -      Musculoskeletal:   [] Normal gait with no signs of ataxia         [x] Normal range of motion of neck        [] Abnormal -     Neurological:        [x] No Facial Asymmetry (Cranial nerve 7 motor function) (limited exam due to video visit)          [x] No gaze palsy        [] Abnormal -          Skin:        [x] No significant exanthematous lesions or discoloration noted on facial skin         [] Abnormal -            Psychiatric:       [x] Normal Affect [] Abnormal -        [] No

## 2025-01-24 ENCOUNTER — TELEPHONE (OUTPATIENT)
Dept: FAMILY MEDICINE CLINIC | Age: 60
End: 2025-01-24

## 2025-01-24 DIAGNOSIS — J22 LOWER RESPIRATORY INFECTION: Primary | ICD-10-CM

## 2025-01-24 RX ORDER — AZITHROMYCIN 250 MG/1
250 TABLET, FILM COATED ORAL SEE ADMIN INSTRUCTIONS
Qty: 6 TABLET | Refills: 0 | Status: SHIPPED | OUTPATIENT
Start: 2025-01-24 | End: 2025-01-29

## 2025-01-24 NOTE — TELEPHONE ENCOUNTER
Pt called she is not getting any better, feels worse. Her granddaughter has the same thing and was given an antibiotic (she is getting better). Pt would like to know if a zpak could be sent to [facility].     Please advise.

## 2025-05-01 SDOH — ECONOMIC STABILITY: INCOME INSECURITY: IN THE LAST 12 MONTHS, WAS THERE A TIME WHEN YOU WERE NOT ABLE TO PAY THE MORTGAGE OR RENT ON TIME?: NO

## 2025-05-01 SDOH — ECONOMIC STABILITY: TRANSPORTATION INSECURITY
IN THE PAST 12 MONTHS, HAS LACK OF TRANSPORTATION KEPT YOU FROM MEETINGS, WORK, OR FROM GETTING THINGS NEEDED FOR DAILY LIVING?: NO

## 2025-05-01 SDOH — ECONOMIC STABILITY: FOOD INSECURITY: WITHIN THE PAST 12 MONTHS, THE FOOD YOU BOUGHT JUST DIDN'T LAST AND YOU DIDN'T HAVE MONEY TO GET MORE.: NEVER TRUE

## 2025-05-01 SDOH — ECONOMIC STABILITY: FOOD INSECURITY: WITHIN THE PAST 12 MONTHS, YOU WORRIED THAT YOUR FOOD WOULD RUN OUT BEFORE YOU GOT MONEY TO BUY MORE.: NEVER TRUE

## 2025-05-01 SDOH — ECONOMIC STABILITY: TRANSPORTATION INSECURITY
IN THE PAST 12 MONTHS, HAS THE LACK OF TRANSPORTATION KEPT YOU FROM MEDICAL APPOINTMENTS OR FROM GETTING MEDICATIONS?: NO

## 2025-05-01 ASSESSMENT — PATIENT HEALTH QUESTIONNAIRE - PHQ9
SUM OF ALL RESPONSES TO PHQ QUESTIONS 1-9: 0
2. FEELING DOWN, DEPRESSED OR HOPELESS: NOT AT ALL
SUM OF ALL RESPONSES TO PHQ QUESTIONS 1-9: 0
SUM OF ALL RESPONSES TO PHQ QUESTIONS 1-9: 0
1. LITTLE INTEREST OR PLEASURE IN DOING THINGS: NOT AT ALL
SUM OF ALL RESPONSES TO PHQ QUESTIONS 1-9: 0

## 2025-05-01 ASSESSMENT — ANXIETY QUESTIONNAIRES
GAD7 TOTAL SCORE: 0
IF YOU CHECKED OFF ANY PROBLEMS ON THIS QUESTIONNAIRE, HOW DIFFICULT HAVE THESE PROBLEMS MADE IT FOR YOU TO DO YOUR WORK, TAKE CARE OF THINGS AT HOME, OR GET ALONG WITH OTHER PEOPLE: NOT DIFFICULT AT ALL
5. BEING SO RESTLESS THAT IT IS HARD TO SIT STILL: NOT AT ALL
7. FEELING AFRAID AS IF SOMETHING AWFUL MIGHT HAPPEN: NOT AT ALL
6. BECOMING EASILY ANNOYED OR IRRITABLE: NOT AT ALL
2. NOT BEING ABLE TO STOP OR CONTROL WORRYING: NOT AT ALL
4. TROUBLE RELAXING: NOT AT ALL
1. FEELING NERVOUS, ANXIOUS, OR ON EDGE: NOT AT ALL
3. WORRYING TOO MUCH ABOUT DIFFERENT THINGS: NOT AT ALL

## 2025-05-02 ASSESSMENT — ANXIETY QUESTIONNAIRES
2. NOT BEING ABLE TO STOP OR CONTROL WORRYING: NOT AT ALL
5. BEING SO RESTLESS THAT IT IS HARD TO SIT STILL: NOT AT ALL
7. FEELING AFRAID AS IF SOMETHING AWFUL MIGHT HAPPEN: NOT AT ALL
4. TROUBLE RELAXING: NOT AT ALL
6. BECOMING EASILY ANNOYED OR IRRITABLE: NOT AT ALL
IF YOU CHECKED OFF ANY PROBLEMS ON THIS QUESTIONNAIRE, HOW DIFFICULT HAVE THESE PROBLEMS MADE IT FOR YOU TO DO YOUR WORK, TAKE CARE OF THINGS AT HOME, OR GET ALONG WITH OTHER PEOPLE: NOT DIFFICULT AT ALL
3. WORRYING TOO MUCH ABOUT DIFFERENT THINGS: NOT AT ALL
1. FEELING NERVOUS, ANXIOUS, OR ON EDGE: NOT AT ALL

## 2025-05-02 ASSESSMENT — PATIENT HEALTH QUESTIONNAIRE - PHQ9
2. FEELING DOWN, DEPRESSED OR HOPELESS: NOT AT ALL
SUM OF ALL RESPONSES TO PHQ9 QUESTIONS 1 & 2: 0
1. LITTLE INTEREST OR PLEASURE IN DOING THINGS: NOT AT ALL

## 2025-05-12 ENCOUNTER — OFFICE VISIT (OUTPATIENT)
Dept: FAMILY MEDICINE CLINIC | Age: 60
End: 2025-05-12
Payer: COMMERCIAL

## 2025-05-12 VITALS
TEMPERATURE: 97 F | OXYGEN SATURATION: 97 % | HEART RATE: 74 BPM | SYSTOLIC BLOOD PRESSURE: 126 MMHG | RESPIRATION RATE: 15 BRPM | DIASTOLIC BLOOD PRESSURE: 74 MMHG

## 2025-05-12 DIAGNOSIS — Z11.59 NEED FOR HEPATITIS C SCREENING TEST: ICD-10-CM

## 2025-05-12 DIAGNOSIS — Z12.31 ENCOUNTER FOR SCREENING MAMMOGRAM FOR BREAST CANCER: ICD-10-CM

## 2025-05-12 DIAGNOSIS — Z11.4 SCREENING FOR HIV WITHOUT PRESENCE OF RISK FACTORS: ICD-10-CM

## 2025-05-12 DIAGNOSIS — Z00.00 ENCOUNTER FOR MEDICAL EXAMINATION TO ESTABLISH CARE: ICD-10-CM

## 2025-05-12 DIAGNOSIS — E03.9 HYPOTHYROIDISM, UNSPECIFIED TYPE: ICD-10-CM

## 2025-05-12 DIAGNOSIS — Z12.11 COLON CANCER SCREENING: ICD-10-CM

## 2025-05-12 DIAGNOSIS — Z00.00 ENCOUNTER FOR MEDICAL EXAMINATION TO ESTABLISH CARE: Primary | ICD-10-CM

## 2025-05-12 LAB
ALBUMIN SERPL-MCNC: 4.5 G/DL (ref 3.4–5)
ALBUMIN/GLOB SERPL: 2 {RATIO} (ref 1.1–2.2)
ALP SERPL-CCNC: 74 U/L (ref 40–129)
ALT SERPL-CCNC: 17 U/L (ref 10–40)
ANION GAP SERPL CALCULATED.3IONS-SCNC: 9 MMOL/L (ref 3–16)
AST SERPL-CCNC: 27 U/L (ref 15–37)
BASOPHILS # BLD: 0 K/UL (ref 0–0.2)
BASOPHILS NFR BLD: 0.6 %
BILIRUB SERPL-MCNC: 0.4 MG/DL (ref 0–1)
BUN SERPL-MCNC: 25 MG/DL (ref 7–20)
CALCIUM SERPL-MCNC: 9.5 MG/DL (ref 8.3–10.6)
CHLORIDE SERPL-SCNC: 106 MMOL/L (ref 99–110)
CHOLEST SERPL-MCNC: 244 MG/DL (ref 0–199)
CO2 SERPL-SCNC: 27 MMOL/L (ref 21–32)
CREAT SERPL-MCNC: 0.8 MG/DL (ref 0.6–1.2)
DEPRECATED RDW RBC AUTO: 13 % (ref 12.4–15.4)
EOSINOPHIL # BLD: 0.3 K/UL (ref 0–0.6)
EOSINOPHIL NFR BLD: 6.1 %
GFR SERPLBLD CREATININE-BSD FMLA CKD-EPI: 84 ML/MIN/{1.73_M2}
GLUCOSE SERPL-MCNC: 88 MG/DL (ref 70–99)
HCT VFR BLD AUTO: 36.6 % (ref 36–48)
HCV AB SERPL QL IA: NORMAL
HDLC SERPL-MCNC: 61 MG/DL (ref 40–60)
HGB BLD-MCNC: 12.8 G/DL (ref 12–16)
LDL CHOLESTEROL: 171 MG/DL
LYMPHOCYTES # BLD: 1.3 K/UL (ref 1–5.1)
LYMPHOCYTES NFR BLD: 30.4 %
MCH RBC QN AUTO: 31.1 PG (ref 26–34)
MCHC RBC AUTO-ENTMCNC: 34.9 G/DL (ref 31–36)
MCV RBC AUTO: 89.1 FL (ref 80–100)
MONOCYTES # BLD: 0.5 K/UL (ref 0–1.3)
MONOCYTES NFR BLD: 10.3 %
NEUTROPHILS # BLD: 2.3 K/UL (ref 1.7–7.7)
NEUTROPHILS NFR BLD: 52.6 %
PLATELET # BLD AUTO: 238 K/UL (ref 135–450)
PMV BLD AUTO: 8.4 FL (ref 5–10.5)
POTASSIUM SERPL-SCNC: 4.8 MMOL/L (ref 3.5–5.1)
PROT SERPL-MCNC: 6.8 G/DL (ref 6.4–8.2)
RBC # BLD AUTO: 4.11 M/UL (ref 4–5.2)
SODIUM SERPL-SCNC: 142 MMOL/L (ref 136–145)
TRIGL SERPL-MCNC: 61 MG/DL (ref 0–150)
TSH SERPL DL<=0.005 MIU/L-ACNC: 2.43 UIU/ML (ref 0.27–4.2)
VLDLC SERPL CALC-MCNC: 12 MG/DL
WBC # BLD AUTO: 4.4 K/UL (ref 4–11)

## 2025-05-12 PROCEDURE — 99396 PREV VISIT EST AGE 40-64: CPT | Performed by: SURGERY

## 2025-05-12 RX ORDER — ALBUTEROL SULFATE 90 UG/1
2 INHALANT RESPIRATORY (INHALATION) EVERY 6 HOURS PRN
COMMUNITY
Start: 2025-01-29 | End: 2025-05-12

## 2025-05-12 RX ORDER — PREDNISONE 10 MG/1
TABLET ORAL
COMMUNITY
Start: 2025-01-29 | End: 2025-05-12

## 2025-05-12 ASSESSMENT — ENCOUNTER SYMPTOMS
SORE THROAT: 0
CONSTIPATION: 0
COUGH: 0
ABDOMINAL PAIN: 0
DIARRHEA: 0
SHORTNESS OF BREATH: 0
NAUSEA: 0

## 2025-05-12 NOTE — PROGRESS NOTES
2025    Adenike De Luna (:  1965) is a 60 y.o. female, here for a preventive medicine evaluation.    Subjective   There is no problem list on file for this patient.    Previous Dr Aldrich pt, last visit was physical in 2024  History of Present Illness  The patient presents for evaluation of osteoarthritis, sleep disturbance, hypothyroidism, and health maintenance.    She reports experiencing osteoarthritis, with the most severe symptoms localized in her right foot, hands, and shoulder. She attributes this to her physically demanding work and gardening activities. She also mentions a stinging sensation in her clavicle. She manages her symptoms with fish oil supplements and avoids taking medication on particularly difficult days.    Her sleep quality varies, with some nights being more restful than others. She typically wakes up once or twice during the night to use the restroom and occasionally struggles to return to sleep. She does not experience restless legs or cramping at night. She also reports no palpitations, dizziness, or headaches. She has a history of Meniere's disease but notes that episodes are infrequent and mild. She does not experience swelling in her feet or ankles. Her bowel movements are regular, and she does not report any issues with diarrhea or constipation. She continues to experience hot flashes post-menopause.    She is currently on a low dose of levothyroxine and is interested in exploring natural alternatives to this medication.    She is due for a mammogram and has expressed interest in undergoing an MRI instead. She has not had a mammogram in a long time. She is also due for a colonoscopy but is hesitant due to a previous unpleasant experience with the procedure. She has no family history of colon cancer.    FAMILY HISTORY  Her mother was on thyroid medication for most of her life.  She has no family history of colon cancer.        Review of Systems   Constitutional:

## 2025-05-13 ENCOUNTER — RESULTS FOLLOW-UP (OUTPATIENT)
Dept: FAMILY MEDICINE CLINIC | Age: 60
End: 2025-05-13

## 2025-05-14 LAB
HIV 1+2 AB+HIV1 P24 AG SERPL QL IA: NORMAL
HIV 2 AB SERPL QL IA: NORMAL
HIV1 AB SERPL QL IA: NORMAL
HIV1 P24 AG SERPL QL IA: NORMAL

## 2025-05-27 ENCOUNTER — TELEPHONE (OUTPATIENT)
Dept: FAMILY MEDICINE CLINIC | Age: 60
End: 2025-05-27

## 2025-05-27 NOTE — TELEPHONE ENCOUNTER
----- Message from Bina GUNDERSON sent at 5/27/2025  4:23 PM EDT -----  Regarding: [facility] Referral Request  [facility] Referral Request    Reason for referral request: Other MRI    Specialist/Lab/Test patient is requesting (if known):    Specialist Phone Number (if applicable):    Additional Information The patient is requesting an order for an MRI (with and without contrast) to be sent at the email address: [email or web address]  --------------------------------------------------------------------------------------------------------------------------    Relationship to Patient: Self     Call Back Information: OK to leave message on voicemail  Preferred Call Back Number: Phone [phone]

## 2025-06-03 DIAGNOSIS — Z12.31 ENCOUNTER FOR SCREENING MAMMOGRAM FOR MALIGNANT NEOPLASM OF BREAST: Primary | ICD-10-CM

## 2025-08-07 LAB — NONINV COLON CA DNA+OCC BLD SCRN STL QL: NEGATIVE
